# Patient Record
Sex: FEMALE | Race: WHITE | Employment: OTHER | URBAN - METROPOLITAN AREA
[De-identification: names, ages, dates, MRNs, and addresses within clinical notes are randomized per-mention and may not be internally consistent; named-entity substitution may affect disease eponyms.]

---

## 2020-03-23 VITALS
HEIGHT: 66 IN | DIASTOLIC BLOOD PRESSURE: 74 MMHG | BODY MASS INDEX: 33.61 KG/M2 | SYSTOLIC BLOOD PRESSURE: 148 MMHG | HEART RATE: 72 BPM | WEIGHT: 209.12 LBS

## 2020-03-23 RX ORDER — ZINC GLUCONATE 50 MG
50 TABLET ORAL DAILY
COMMUNITY

## 2020-03-23 RX ORDER — AMPICILLIN TRIHYDRATE 250 MG
500 CAPSULE ORAL DAILY
COMMUNITY

## 2020-03-23 RX ORDER — ALBUTEROL SULFATE 90 UG/1
2 AEROSOL, METERED RESPIRATORY (INHALATION) EVERY 6 HOURS PRN
COMMUNITY

## 2020-03-23 RX ORDER — AMOXICILLIN 500 MG
1200 CAPSULE ORAL DAILY
COMMUNITY

## 2020-03-23 RX ORDER — CHOLECALCIFEROL (VITAMIN D3) 125 MCG
500 CAPSULE ORAL DAILY
COMMUNITY

## 2020-03-23 RX ORDER — VITAMIN E 268 MG
400 CAPSULE ORAL DAILY
COMMUNITY

## 2024-01-12 ENCOUNTER — APPOINTMENT (OUTPATIENT)
Dept: GENERAL RADIOLOGY | Age: 89
DRG: 291 | End: 2024-01-12
Payer: MEDICARE

## 2024-01-12 ENCOUNTER — APPOINTMENT (OUTPATIENT)
Dept: ULTRASOUND IMAGING | Age: 89
DRG: 291 | End: 2024-01-12
Payer: MEDICARE

## 2024-01-12 ENCOUNTER — APPOINTMENT (OUTPATIENT)
Dept: CT IMAGING | Age: 89
DRG: 291 | End: 2024-01-12
Payer: MEDICARE

## 2024-01-12 ENCOUNTER — HOSPITAL ENCOUNTER (INPATIENT)
Age: 89
LOS: 6 days | DRG: 291 | End: 2024-01-18
Attending: STUDENT IN AN ORGANIZED HEALTH CARE EDUCATION/TRAINING PROGRAM | Admitting: FAMILY MEDICINE
Payer: MEDICARE

## 2024-01-12 DIAGNOSIS — E87.0 HYPERNATREMIA: ICD-10-CM

## 2024-01-12 DIAGNOSIS — J96.01 ACUTE HYPOXIC RESPIRATORY FAILURE (HCC): Primary | ICD-10-CM

## 2024-01-12 DIAGNOSIS — J18.9 PNEUMONIA DUE TO INFECTIOUS ORGANISM, UNSPECIFIED LATERALITY, UNSPECIFIED PART OF LUNG: ICD-10-CM

## 2024-01-12 DIAGNOSIS — J96.01 ACUTE RESPIRATORY FAILURE WITH HYPOXIA (HCC): ICD-10-CM

## 2024-01-12 DIAGNOSIS — N30.00 ACUTE CYSTITIS WITHOUT HEMATURIA: ICD-10-CM

## 2024-01-12 DIAGNOSIS — E04.1 THYROID NODULE GREATER THAN OR EQUAL TO 1.5 CM IN DIAMETER INCIDENTALLY NOTED ON IMAGING STUDY: ICD-10-CM

## 2024-01-12 DIAGNOSIS — I50.9 ACUTE ON CHRONIC CONGESTIVE HEART FAILURE, UNSPECIFIED HEART FAILURE TYPE (HCC): ICD-10-CM

## 2024-01-12 DIAGNOSIS — R41.82 ALTERED MENTAL STATUS, UNSPECIFIED ALTERED MENTAL STATUS TYPE: ICD-10-CM

## 2024-01-12 PROBLEM — I50.31 CHF (CONGESTIVE HEART FAILURE), NYHA CLASS I, ACUTE, DIASTOLIC (HCC): Status: ACTIVE | Noted: 2024-01-12

## 2024-01-12 LAB
ALBUMIN SERPL-MCNC: 4.2 G/DL (ref 3.5–5.2)
ALP SERPL-CCNC: 92 U/L (ref 35–104)
ALT SERPL-CCNC: 18 U/L (ref 0–32)
ANION GAP SERPL CALCULATED.3IONS-SCNC: 9 MMOL/L (ref 7–16)
AST SERPL-CCNC: 16 U/L (ref 0–31)
B.E.: 1 MMOL/L (ref -3–3)
BACTERIA URNS QL MICRO: ABNORMAL
BASOPHILS # BLD: 0.07 K/UL (ref 0–0.2)
BASOPHILS NFR BLD: 1 % (ref 0–2)
BILIRUB SERPL-MCNC: 0.3 MG/DL (ref 0–1.2)
BILIRUB UR QL STRIP: NEGATIVE
BNP SERPL-MCNC: 2602 PG/ML (ref 0–450)
BUN SERPL-MCNC: 29 MG/DL (ref 6–23)
CALCIUM SERPL-MCNC: 9.3 MG/DL (ref 8.6–10.2)
CHLORIDE SERPL-SCNC: 107 MMOL/L (ref 98–107)
CLARITY UR: ABNORMAL
CO2 SERPL-SCNC: 32 MMOL/L (ref 22–29)
COHB: 1.2 % (ref 0–1.5)
COLOR UR: YELLOW
CREAT SERPL-MCNC: 0.9 MG/DL (ref 0.5–1)
CRITICAL: ABNORMAL
DATE ANALYZED: ABNORMAL
DATE OF COLLECTION: ABNORMAL
EKG ATRIAL RATE: 68 BPM
EKG Q-T INTERVAL: 378 MS
EKG QRS DURATION: 90 MS
EKG QTC CALCULATION (BAZETT): 444 MS
EKG R AXIS: -62 DEGREES
EKG T AXIS: 76 DEGREES
EKG VENTRICULAR RATE: 83 BPM
EOSINOPHIL # BLD: 0.05 K/UL (ref 0.05–0.5)
EOSINOPHILS RELATIVE PERCENT: 1 % (ref 0–6)
EPI CELLS #/AREA URNS HPF: ABNORMAL /HPF
ERYTHROCYTE [DISTWIDTH] IN BLOOD BY AUTOMATED COUNT: 14.7 % (ref 11.5–15)
GFR SERPL CREATININE-BSD FRML MDRD: >60 ML/MIN/1.73M2
GLUCOSE SERPL-MCNC: 118 MG/DL (ref 74–99)
GLUCOSE UR STRIP-MCNC: NEGATIVE MG/DL
HCO3: 28.6 MMOL/L (ref 22–26)
HCT VFR BLD AUTO: 46.1 % (ref 34–48)
HGB BLD-MCNC: 14.1 G/DL (ref 11.5–15.5)
HGB UR QL STRIP.AUTO: NEGATIVE
HHB: 4 % (ref 0–5)
IMM GRANULOCYTES # BLD AUTO: 0.03 K/UL (ref 0–0.58)
IMM GRANULOCYTES NFR BLD: 0 % (ref 0–5)
INFLUENZA A BY PCR: NOT DETECTED
INFLUENZA B BY PCR: NOT DETECTED
KETONES UR STRIP-MCNC: NEGATIVE MG/DL
LAB: ABNORMAL
LACTATE BLDV-SCNC: 1.2 MMOL/L (ref 0.5–1.9)
LEUKOCYTE ESTERASE UR QL STRIP: NEGATIVE
LYMPHOCYTES NFR BLD: 0.81 K/UL (ref 1.5–4)
LYMPHOCYTES RELATIVE PERCENT: 8 % (ref 20–42)
Lab: 1940
MAGNESIUM SERPL-MCNC: 2.3 MG/DL (ref 1.6–2.6)
MCH RBC QN AUTO: 28.7 PG (ref 26–35)
MCHC RBC AUTO-ENTMCNC: 30.6 G/DL (ref 32–34.5)
MCV RBC AUTO: 93.7 FL (ref 80–99.9)
METHB: 0.2 % (ref 0–1.5)
MODE: ABNORMAL
MONOCYTES NFR BLD: 0.77 K/UL (ref 0.1–0.95)
MONOCYTES NFR BLD: 8 % (ref 2–12)
NEUTROPHILS NFR BLD: 82 % (ref 43–80)
NEUTS SEG NFR BLD: 7.87 K/UL (ref 1.8–7.3)
NITRITE UR QL STRIP: POSITIVE
O2 CONTENT: 19.2 ML/DL
O2 SATURATION: 95.9 % (ref 92–98.5)
O2HB: 94.6 % (ref 94–97)
OPERATOR ID: 5523
PATIENT TEMP: 37 C
PCO2: 58.1 MMHG (ref 35–45)
PH BLOOD GAS: 7.31 (ref 7.35–7.45)
PH UR STRIP: 5.5 [PH] (ref 5–9)
PH VENOUS: 7.29 (ref 7.35–7.45)
PLATELET # BLD AUTO: 171 K/UL (ref 130–450)
PMV BLD AUTO: 12.3 FL (ref 7–12)
PO2: 82.6 MMHG (ref 75–100)
POTASSIUM SERPL-SCNC: 4.6 MMOL/L (ref 3.5–5)
PROT SERPL-MCNC: 7.1 G/DL (ref 6.4–8.3)
PROT UR STRIP-MCNC: 30 MG/DL
RBC # BLD AUTO: 4.92 M/UL (ref 3.5–5.5)
RBC #/AREA URNS HPF: ABNORMAL /HPF
SARS-COV-2 RDRP RESP QL NAA+PROBE: NOT DETECTED
SODIUM SERPL-SCNC: 148 MMOL/L (ref 132–146)
SOURCE, BLOOD GAS: ABNORMAL
SP GR UR STRIP: >1.03 (ref 1–1.03)
SPECIMEN DESCRIPTION: NORMAL
THB: 14.4 G/DL (ref 11.5–16.5)
TIME ANALYZED: 1945
TROPONIN I SERPL HS-MCNC: 32 NG/L (ref 0–9)
UROBILINOGEN UR STRIP-ACNC: 1 EU/DL (ref 0–1)
WBC #/AREA URNS HPF: ABNORMAL /HPF
WBC OTHER # BLD: 9.6 K/UL (ref 4.5–11.5)

## 2024-01-12 PROCEDURE — 6360000002 HC RX W HCPCS: Performed by: STUDENT IN AN ORGANIZED HEALTH CARE EDUCATION/TRAINING PROGRAM

## 2024-01-12 PROCEDURE — 82805 BLOOD GASES W/O2 SATURATION: CPT

## 2024-01-12 PROCEDURE — 81001 URINALYSIS AUTO W/SCOPE: CPT

## 2024-01-12 PROCEDURE — 87040 BLOOD CULTURE FOR BACTERIA: CPT

## 2024-01-12 PROCEDURE — 6360000004 HC RX CONTRAST MEDICATION: Performed by: RADIOLOGY

## 2024-01-12 PROCEDURE — 2500000003 HC RX 250 WO HCPCS: Performed by: STUDENT IN AN ORGANIZED HEALTH CARE EDUCATION/TRAINING PROGRAM

## 2024-01-12 PROCEDURE — 84484 ASSAY OF TROPONIN QUANT: CPT

## 2024-01-12 PROCEDURE — 99285 EMERGENCY DEPT VISIT HI MDM: CPT

## 2024-01-12 PROCEDURE — 2060000000 HC ICU INTERMEDIATE R&B

## 2024-01-12 PROCEDURE — 71260 CT THORAX DX C+: CPT

## 2024-01-12 PROCEDURE — 76536 US EXAM OF HEAD AND NECK: CPT

## 2024-01-12 PROCEDURE — 96365 THER/PROPH/DIAG IV INF INIT: CPT

## 2024-01-12 PROCEDURE — 96375 TX/PRO/DX INJ NEW DRUG ADDON: CPT

## 2024-01-12 PROCEDURE — 87077 CULTURE AEROBIC IDENTIFY: CPT

## 2024-01-12 PROCEDURE — 82800 BLOOD PH: CPT

## 2024-01-12 PROCEDURE — 94640 AIRWAY INHALATION TREATMENT: CPT

## 2024-01-12 PROCEDURE — 93010 ELECTROCARDIOGRAM REPORT: CPT | Performed by: INTERNAL MEDICINE

## 2024-01-12 PROCEDURE — 6360000002 HC RX W HCPCS: Performed by: FAMILY MEDICINE

## 2024-01-12 PROCEDURE — 70450 CT HEAD/BRAIN W/O DYE: CPT

## 2024-01-12 PROCEDURE — 80053 COMPREHEN METABOLIC PANEL: CPT

## 2024-01-12 PROCEDURE — 85025 COMPLETE CBC W/AUTO DIFF WBC: CPT

## 2024-01-12 PROCEDURE — 2580000003 HC RX 258: Performed by: STUDENT IN AN ORGANIZED HEALTH CARE EDUCATION/TRAINING PROGRAM

## 2024-01-12 PROCEDURE — 93005 ELECTROCARDIOGRAM TRACING: CPT | Performed by: STUDENT IN AN ORGANIZED HEALTH CARE EDUCATION/TRAINING PROGRAM

## 2024-01-12 PROCEDURE — 83735 ASSAY OF MAGNESIUM: CPT

## 2024-01-12 PROCEDURE — 87635 SARS-COV-2 COVID-19 AMP PRB: CPT

## 2024-01-12 PROCEDURE — 87502 INFLUENZA DNA AMP PROBE: CPT

## 2024-01-12 PROCEDURE — 83605 ASSAY OF LACTIC ACID: CPT

## 2024-01-12 PROCEDURE — 71045 X-RAY EXAM CHEST 1 VIEW: CPT

## 2024-01-12 PROCEDURE — 83880 ASSAY OF NATRIURETIC PEPTIDE: CPT

## 2024-01-12 PROCEDURE — 6370000000 HC RX 637 (ALT 250 FOR IP): Performed by: STUDENT IN AN ORGANIZED HEALTH CARE EDUCATION/TRAINING PROGRAM

## 2024-01-12 RX ORDER — ONDANSETRON 2 MG/ML
4 INJECTION INTRAMUSCULAR; INTRAVENOUS EVERY 6 HOURS PRN
Status: DISCONTINUED | OUTPATIENT
Start: 2024-01-12 | End: 2024-01-18 | Stop reason: HOSPADM

## 2024-01-12 RX ORDER — 0.9 % SODIUM CHLORIDE 0.9 %
500 INTRAVENOUS SOLUTION INTRAVENOUS ONCE
Status: COMPLETED | OUTPATIENT
Start: 2024-01-12 | End: 2024-01-12

## 2024-01-12 RX ORDER — ENOXAPARIN SODIUM 100 MG/ML
40 INJECTION SUBCUTANEOUS DAILY
Status: DISCONTINUED | OUTPATIENT
Start: 2024-01-12 | End: 2024-01-18 | Stop reason: DRUGHIGH

## 2024-01-12 RX ORDER — ACETAMINOPHEN 325 MG/1
650 TABLET ORAL EVERY 6 HOURS PRN
Status: DISCONTINUED | OUTPATIENT
Start: 2024-01-12 | End: 2024-01-18 | Stop reason: HOSPADM

## 2024-01-12 RX ORDER — ACETAMINOPHEN 650 MG/1
650 SUPPOSITORY RECTAL EVERY 6 HOURS PRN
Status: DISCONTINUED | OUTPATIENT
Start: 2024-01-12 | End: 2024-01-18 | Stop reason: HOSPADM

## 2024-01-12 RX ORDER — POTASSIUM CHLORIDE 7.45 MG/ML
10 INJECTION INTRAVENOUS PRN
Status: DISCONTINUED | OUTPATIENT
Start: 2024-01-12 | End: 2024-01-18 | Stop reason: HOSPADM

## 2024-01-12 RX ORDER — SODIUM CHLORIDE 0.9 % (FLUSH) 0.9 %
5-40 SYRINGE (ML) INJECTION PRN
Status: DISCONTINUED | OUTPATIENT
Start: 2024-01-12 | End: 2024-01-18 | Stop reason: HOSPADM

## 2024-01-12 RX ORDER — POTASSIUM CHLORIDE 20 MEQ/1
40 TABLET, EXTENDED RELEASE ORAL PRN
Status: DISCONTINUED | OUTPATIENT
Start: 2024-01-12 | End: 2024-01-18 | Stop reason: HOSPADM

## 2024-01-12 RX ORDER — POLYETHYLENE GLYCOL 3350 17 G/17G
17 POWDER, FOR SOLUTION ORAL DAILY PRN
Status: DISCONTINUED | OUTPATIENT
Start: 2024-01-12 | End: 2024-01-18 | Stop reason: HOSPADM

## 2024-01-12 RX ORDER — LISINOPRIL 5 MG/1
5 TABLET ORAL DAILY
Status: DISCONTINUED | OUTPATIENT
Start: 2024-01-13 | End: 2024-01-18 | Stop reason: HOSPADM

## 2024-01-12 RX ORDER — IPRATROPIUM BROMIDE AND ALBUTEROL SULFATE 2.5; .5 MG/3ML; MG/3ML
1 SOLUTION RESPIRATORY (INHALATION) ONCE
Status: COMPLETED | OUTPATIENT
Start: 2024-01-12 | End: 2024-01-12

## 2024-01-12 RX ORDER — SODIUM CHLORIDE 9 MG/ML
INJECTION, SOLUTION INTRAVENOUS PRN
Status: DISCONTINUED | OUTPATIENT
Start: 2024-01-12 | End: 2024-01-18 | Stop reason: HOSPADM

## 2024-01-12 RX ORDER — BUMETANIDE 0.25 MG/ML
2 INJECTION INTRAMUSCULAR; INTRAVENOUS DAILY
Status: DISCONTINUED | OUTPATIENT
Start: 2024-01-13 | End: 2024-01-18 | Stop reason: HOSPADM

## 2024-01-12 RX ORDER — CARVEDILOL 3.12 MG/1
3.12 TABLET ORAL 2 TIMES DAILY WITH MEALS
Status: DISCONTINUED | OUTPATIENT
Start: 2024-01-13 | End: 2024-01-18 | Stop reason: HOSPADM

## 2024-01-12 RX ORDER — MAGNESIUM SULFATE IN WATER 40 MG/ML
2000 INJECTION, SOLUTION INTRAVENOUS PRN
Status: DISCONTINUED | OUTPATIENT
Start: 2024-01-12 | End: 2024-01-18 | Stop reason: HOSPADM

## 2024-01-12 RX ORDER — SODIUM CHLORIDE 0.9 % (FLUSH) 0.9 %
5-40 SYRINGE (ML) INJECTION EVERY 12 HOURS SCHEDULED
Status: DISCONTINUED | OUTPATIENT
Start: 2024-01-12 | End: 2024-01-18 | Stop reason: HOSPADM

## 2024-01-12 RX ORDER — FUROSEMIDE 10 MG/ML
20 INJECTION INTRAMUSCULAR; INTRAVENOUS ONCE
Status: COMPLETED | OUTPATIENT
Start: 2024-01-12 | End: 2024-01-12

## 2024-01-12 RX ORDER — ONDANSETRON 4 MG/1
4 TABLET, ORALLY DISINTEGRATING ORAL EVERY 8 HOURS PRN
Status: DISCONTINUED | OUTPATIENT
Start: 2024-01-12 | End: 2024-01-18 | Stop reason: HOSPADM

## 2024-01-12 RX ADMIN — SODIUM CHLORIDE 500 ML: 9 INJECTION, SOLUTION INTRAVENOUS at 14:41

## 2024-01-12 RX ADMIN — IPRATROPIUM BROMIDE AND ALBUTEROL SULFATE 1 DOSE: .5; 3 SOLUTION RESPIRATORY (INHALATION) at 14:08

## 2024-01-12 RX ADMIN — ENOXAPARIN SODIUM 40 MG: 100 INJECTION SUBCUTANEOUS at 22:56

## 2024-01-12 RX ADMIN — WATER 2000 MG: 1 INJECTION INTRAMUSCULAR; INTRAVENOUS; SUBCUTANEOUS at 17:56

## 2024-01-12 RX ADMIN — IOPAMIDOL 75 ML: 755 INJECTION, SOLUTION INTRAVENOUS at 18:30

## 2024-01-12 RX ADMIN — FUROSEMIDE 20 MG: 10 INJECTION, SOLUTION INTRAMUSCULAR; INTRAVENOUS at 19:46

## 2024-01-12 RX ADMIN — DOXYCYCLINE 100 MG: 100 INJECTION, POWDER, LYOPHILIZED, FOR SOLUTION INTRAVENOUS at 17:56

## 2024-01-12 NOTE — ED PROVIDER NOTES
weight based adjustment of the mA/kV was utilized to reduce the radiation dose to as low as reasonably achievable. COMPARISON: None. HISTORY: ORDERING SYSTEM PROVIDED HISTORY: concern for mass in head TECHNOLOGIST PROVIDED HISTORY: Reason for exam:->concern for mass in head Additional Contrast?->None FINDINGS: Mediastinum: There is a large, 6.5 x 5.8 x 8.7 cm right thyroid lobe nodule, which extends into the upper mediastinum an resulting displacement of the trachea towards the left by approximately 1.3 cm. The heart is mildly enlarged.  Moderate pericardial effusion.  Moderate calcified atherosclerosis is seen in the aorta.  No aneurysm.  The main pulmonary artery is normal in caliber.  No enlarged lymph node is seen. Lungs/pleura: Mild interstitial edema is seen.  Compressive atelectasis seen in the lower lobes.  Small to moderate bilateral pleural effusions. Upper Abdomen: No acute abnormality seen in the upper abdomen. Soft Tissues/Bones: The visualized bones are intact without fracture or focal lesion.     1. Cardiomegaly with mild interstitial edema and small to moderate bilateral pleural effusions.  Findings are consistent with CHF exacerbation. 2. Moderate pericardial effusion. 3. Large, 6.5 x 5.8 x 8.7 cm right thyroid lobe nodule, which extends into the upper mediastinum an resulting in displacement of the trachea towards the left by approximately 1.3 cm.  Further evaluation with sonography and FNA recommended.     CT HEAD WO CONTRAST    Result Date: 1/12/2024  EXAMINATION: CT OF THE HEAD WITHOUT CONTRAST  1/12/2024 3:06 pm TECHNIQUE: CT of the head was performed without the administration of intravenous contrast. Automated exposure control, iterative reconstruction, and/or weight based adjustment of the mA/kV was utilized to reduce the radiation dose to as low as reasonably achievable. COMPARISON: None. HISTORY: ORDERING SYSTEM PROVIDED HISTORY: [facility] TECHNOLOGIST PROVIDED HISTORY: Reason for exam:->ams Has  upper mediastinum an resulting in displacement of the trachea towards the   left by approximately 1.3 cm.  Further evaluation with sonography and FNA   recommended.         CT HEAD WO CONTRAST   Final Result   No acute intracranial abnormality.      Mild, age-appropriate cerebral atrophy.      Mild right maxillary acute sinusitis.         XR CHEST PORTABLE   Final Result   1. Bilateral lower lobe airspace disease, left greater than right,   atelectasis versus pneumonia.   2. Mild congestive failure.   3. Soft tissue fullness in the right superior mediastinum with moderate   displacement of the trachea towards the left. I suspect that this is a large   right thyroid goiter with prominent substernal extension, but other superior   mediastinal mass cannot be excluded.      RECOMMENDATION:   Recommend CT of the chest with contrast on a nonemergent basis to further   characterize the right superior mediastinal mass.                 ------------------------- NURSING NOTES AND VITALS REVIEWED ---------------------------  Date / Time Roomed:  1/12/2024  1:24 PM  ED Bed Assignment:  17/17    The nursing notes within the ED encounter and vital signs as below have been reviewed.     Patient Vitals for the past 24 hrs:   BP Temp Temp src Pulse Resp SpO2   01/12/24 1946 (!) 161/84 -- -- -- -- --   01/12/24 1800 -- -- -- 81 29 97 %   01/12/24 1730 (!) 144/105 -- -- (!) 209 24 (!) 78 %   01/12/24 1700 (!) 151/90 -- -- 76 25 96 %   01/12/24 1630 (!) 164/112 -- -- 73 23 93 %   01/12/24 1238 (!) 119/102 98.9 °F (37.2 °C) Oral 88 22 99 %       Oxygen Saturation Interpretation: Normal    ------------------------------------------ PROGRESS NOTES ------------------------------------------  Re-evaluation(s):   Patient’s symptoms show no change  Repeat physical examination is not changed    Counseling:  I have spoken with the patient and discussed today’s results, in addition to providing specific details for the plan of care and

## 2024-01-13 LAB
ANION GAP SERPL CALCULATED.3IONS-SCNC: 13 MMOL/L (ref 7–16)
BUN SERPL-MCNC: 27 MG/DL (ref 6–23)
CALCIUM SERPL-MCNC: 8.7 MG/DL (ref 8.6–10.2)
CHLORIDE SERPL-SCNC: 106 MMOL/L (ref 98–107)
CHOLEST SERPL-MCNC: 145 MG/DL
CO2 SERPL-SCNC: 29 MMOL/L (ref 22–29)
CREAT SERPL-MCNC: 0.8 MG/DL (ref 0.5–1)
GFR SERPL CREATININE-BSD FRML MDRD: >60 ML/MIN/1.73M2
GLUCOSE SERPL-MCNC: 126 MG/DL (ref 74–99)
HDLC SERPL-MCNC: 49 MG/DL
LDLC SERPL CALC-MCNC: 78 MG/DL
MAGNESIUM SERPL-MCNC: 2.2 MG/DL (ref 1.6–2.6)
POTASSIUM SERPL-SCNC: 5.2 MMOL/L (ref 3.5–5)
SODIUM SERPL-SCNC: 148 MMOL/L (ref 132–146)
TRIGL SERPL-MCNC: 88 MG/DL
TROPONIN I SERPL HS-MCNC: 30 NG/L (ref 0–9)
VLDLC SERPL CALC-MCNC: 18 MG/DL

## 2024-01-13 PROCEDURE — 36415 COLL VENOUS BLD VENIPUNCTURE: CPT

## 2024-01-13 PROCEDURE — 80061 LIPID PANEL: CPT

## 2024-01-13 PROCEDURE — 87040 BLOOD CULTURE FOR BACTERIA: CPT

## 2024-01-13 PROCEDURE — 87081 CULTURE SCREEN ONLY: CPT

## 2024-01-13 PROCEDURE — 87899 AGENT NOS ASSAY W/OPTIC: CPT

## 2024-01-13 PROCEDURE — 80048 BASIC METABOLIC PNL TOTAL CA: CPT

## 2024-01-13 PROCEDURE — 83735 ASSAY OF MAGNESIUM: CPT

## 2024-01-13 PROCEDURE — 84484 ASSAY OF TROPONIN QUANT: CPT

## 2024-01-13 PROCEDURE — 2580000003 HC RX 258: Performed by: FAMILY MEDICINE

## 2024-01-13 PROCEDURE — 87449 NOS EACH ORGANISM AG IA: CPT

## 2024-01-13 PROCEDURE — 99223 1ST HOSP IP/OBS HIGH 75: CPT | Performed by: INTERNAL MEDICINE

## 2024-01-13 PROCEDURE — 6360000002 HC RX W HCPCS: Performed by: FAMILY MEDICINE

## 2024-01-13 PROCEDURE — 2060000000 HC ICU INTERMEDIATE R&B

## 2024-01-13 PROCEDURE — 84145 PROCALCITONIN (PCT): CPT

## 2024-01-13 RX ADMIN — BUMETANIDE 2 MG: 0.25 INJECTION INTRAMUSCULAR; INTRAVENOUS at 11:24

## 2024-01-13 RX ADMIN — ENOXAPARIN SODIUM 40 MG: 100 INJECTION SUBCUTANEOUS at 11:24

## 2024-01-13 RX ADMIN — SODIUM CHLORIDE, PRESERVATIVE FREE 10 ML: 5 INJECTION INTRAVENOUS at 11:25

## 2024-01-13 NOTE — H&P
HISTORY AND PHYSICAL             Date: 1/13/2024        Patient Name: Cris Mejia     YOB: 1935      Age:  88 y.o.    Chief Complaint     Chief Complaint   Patient presents with    Shortness of Breath     81% on room air, 96% on 4L NC; afib for ems with no hx     Altered Mental Status     From [facility] via ems, hx alzheimer, no meds, alert to self baseline, increased confusion and lethargy; no last known well per nursing home; DNR-CCA, bgl 164 for ems           History Obtained From   electronic medical record    History of Present Illness   88-year-old female from assisted living presents with increased lethargy and shortness of breath over the past few days.  Patient has a history of hypertension hyperlipidemia, diabetes, Alzheimer's dementia history of breast cancer.  Patient had an episode of dysphagia a week ago.  Speech pathology had recommended a modified barium swallow.  Patient presented to the ED for workup.  CT showed cardiomegaly with pulmonary edema.  proBNP was elevated at 2000.  pH on ABG was 7.3.  Patient improved with oxygen IV diuresis.  Right thyroid nodule noted for recommending fine-needle aspiration as well.  Patient will be admitted for acute hypoxic respiratory failure, CHF exacerbation.    Past Medical History     Past Medical History:   Diagnosis Date    Asthma     CAD (coronary artery disease)     Hypercalcemia     Hypertension     Hypokalemia     Obesity     Osteoarthritis of knee         Past Surgical History   No past surgical history on file.     Medications Prior to Admission     Prior to Admission medications    Medication Sig Start Date End Date Taking? Authorizing Provider   Calcium-Magnesium-Vitamin D 600-300-400 LIQD Take by mouth daily Indications: 3 by mouth daily    Roberth Arellano MD   Cinnamon 500 MG CAPS Take 500 mg by mouth daily    Roberth Arellano MD   vitamin E 400 UNIT capsule Take 400 Units by mouth daily    Roberth Arellano  emergency medical condition->Emergency Medical Condition (MA) FINDINGS: BRAIN/VENTRICLES: There is no acute intracranial hemorrhage, mass effect or midline shift.  No abnormal extra-axial fluid collection.  The gray-white differentiation is maintained without evidence of an acute infarct.  There is no evidence of hydrocephalus. There is mild age-appropriate dilatation of the ventricles and sulci. ORBITS: There are changes of bilateral cataract surgery.  The orbits are otherwise normal in appearance. SINUSES: There is a mild right maxillary sinus air-fluid level from acute sinusitis.  The rest of the visualized paranasal sinuses and mastoids are clear. SOFT TISSUES/SKULL:  No acute abnormality of the visualized skull or soft tissues. There is moderate hyperostosis frontalis.     No acute intracranial abnormality. Mild, age-appropriate cerebral atrophy. Mild right maxillary acute sinusitis.     XR CHEST PORTABLE    Result Date: 1/12/2024  EXAMINATION: ONE XRAY VIEW OF THE CHEST 1/12/2024 2:39 pm COMPARISON: None. HISTORY: ORDERING SYSTEM PROVIDED HISTORY: shortness of breath TECHNOLOGIST PROVIDED HISTORY: Reason for exam:->shortness of breath FINDINGS: There is prominent consolidation of the retrocardiac left lower lobe along with a small left pleural effusion, atelectasis versus pneumonia. There is moderate patchy infiltrate throughout the right lower lobe with a tiny right pleural effusion, atelectasis versus pneumonia. There is mild vascular engorgement and mild interstitial pulmonary edema throughout the rest of the chest, findings of mild congestive failure. The heart is top-normal in size. There is prominent soft tissue fullness in the right superior mediastinum with moderate displacement of the trachea towards the left.  I suspect that this is a large right thyroid goiter with prominent substernal extension, but other superior mediastinal mass cannot be excluded.  Recommend correlation with CT of the chest with

## 2024-01-13 NOTE — CONSULTS
Comprehensive Nutrition Assessment    Type and Reason for Visit:  Initial, Consult, Patient Education    Nutrition Recommendations/Plan:   Continue current diet  Monitor nutrition progression and provide recommendations as indicated/medically appropriate      Malnutrition Assessment:  Malnutrition Status:  At risk for malnutrition (Comment) (d/t dysphagia) (01/13/24 1228)    Context:        Findings of the 6 clinical characteristics of malnutrition:  Energy Intake:  Unable to assess (d/t ams)  Weight Loss:  Unable to assess (no wt hx in emr)     Body Fat Loss:  No significant body fat loss     Muscle Mass Loss:  No significant muscle mass loss    Fluid Accumulation:  No significant fluid accumulation     Strength:       Nutrition Assessment:    Pt admit for ARF w/ hypoxia, AMS. Dx: CHF exacerbation, pulmonary edema, cardimegaly, right tyhroid nodule that may be causing dysphagia. Speech following, swallow eval pending. PMHx: Alzheimer's, HTN, HLD, Breast cancer, Diabetes. Pt consult for DI. Pt inappropriate at this time d/t AMS. Upon chart reveiw pt is at potential risk for MAL d/t dyphagia `1 week ago, AMS and thyroid nodule. FILIBERTO po intake d/t AMS. Malnutrition not identified. Continue curretn diet, continue to monitor nutrition progression and provide recommendations as indicated/medically appropriate, f/up per policy.    Nutrition Related Findings:    pt a//o x0, unable to be aroused, I/O =100, Na+ 148, k+ 5.2, BUN 27, Troponin 30, HCo#28.6, PH blood gas 7.310, PCO2 58.1 Wound Type: None       Current Nutrition Intake & Therapies:    Average Meal Intake: Unable to assess  Average Supplements Intake: None Ordered  ADULT DIET; Regular; No Added Salt (3-4 gm)    Anthropometric Measures:  Height:  (no height in emr)  Ideal Body Weight (IBW):   ( )    Admission Body Weight: 77.3 kg (170 lb 6.7 oz)  Current Body Weight: 77.3 kg (170 lb 6.7 oz),   IBW. Weight Source: Not Specified (01/13/24)  Current BMI (kg/m2):   Scheduled

## 2024-01-13 NOTE — PROGRESS NOTES
SPEECH LANGUAGE PATHOLOGY  DAILY PROGRESS NOTE        PATIENT NAME:  Cris Mejia      :  1935          TODAY'S DATE:  2024 ROOM:  [address] [place] [address]    Attempted to see patient for bedside swallow evaluation this date.  Nurse was present in the room.  We attempted to wake patient but patient did not respond and did not follow any directives to open her eyes.  The nurse reports that she has not been able to wake the patient since her arrival to this unit earlier this morning.    Since patient is not able to be awake and alert, it is not appropriate to attempt any food/liquid presentation at this time.  Will complete the requested video swallow study on Monday if patient is alert and able to actively participate.      Eugenie Marcos MA/[facility]-SL[facility]  [phone]

## 2024-01-13 NOTE — CONSULTS
[address] [place] [address] [address] [place] [address]  Phone [phone]   [phone]      Admit Date: 2024  1:24 PM  Pt Name: Cris Mejia  MRN: [id number]  : 1935  Reason for Consult:    Chief Complaint   Patient presents with    Shortness of Breath     81% on room air, 96% on 4L NC; afib for ems with no hx     Altered Mental Status     From [facility] via ems, hx alzheimer, no meds, alert to self baseline, increased confusion and lethargy; no last known well per nursing home; DNR-CCA, bgl 164 for ems      Requesting Physician:  Matt Rodríguez DO  PCP: Matt Rodríguez DO  History Obtained From:  patient, chart   ID consulted for Hypernatremia [E87.0]  Acute cystitis without hematuria [N30.00]  Acute respiratory failure with hypoxia (HCC) [J96.01]  CHF (congestive heart failure), NYHA class I, acute, diastolic (HCC) [I50.31]  Altered mental status, unspecified altered mental status type [R41.82]  Pneumonia due to infectious organism, unspecified laterality, unspecified part of lung [J18.9]  Acute on chronic congestive heart failure, unspecified heart failure type (HCC) [I50.9]  Acute hypoxic respiratory failure (HCC) [J96.01]  on hospital day 1  CHIEF COMPLAINT       Chief Complaint   Patient presents with    Shortness of Breath     81% on room air, 96% on 4L NC; afib for ems with no hx     Altered Mental Status     From [facility] via ems, hx alzheimer, no meds, alert to self baseline, increased confusion and lethargy; no last known well per nursing home; DNR-CCA, bgl 164 for ems      HISTORYOF PRESENT ILLNESS   Cris Mejia is a 88 y.o. female who  has a past medical history of Asthma, CAD (coronary artery disease), Hypercalcemia, Hypertension, Hypokalemia, Obesity, and Osteoarthritis of knee.   Presented to ED TRIAGE VITALS  BP: 134/83, Temp: 97.4 °F (36.3 °C), Pulse: 99, Respirations: 18, SpO2: 99 %  HPI:  ID was consulted on 24 for infection management  Pt  confusion and lethargy; no last known well per nursing home; DNR-CCA, bgl 164 for ems     and admitted for Hypernatremia [E87.0]  Acute cystitis without hematuria [N30.00]  Acute respiratory failure with hypoxia (HCC) [J96.01]  CHF (congestive heart failure), NYHA class I, acute, diastolic (HCC) [I50.31]  Altered mental status, unspecified altered mental status type [R41.82]  Pneumonia due to infectious organism, unspecified laterality, unspecified part of lung [J18.9]  Acute on chronic congestive heart failure, unspecified heart failure type (HCC) [I50.9]  Acute hypoxic respiratory failure (HCC) [J96.01]  Pt with altered MS  Gpc clusters bacteremia  vanco x1 level in am   Uti cx pending   Bilateral lower lobe airspace disease, left greater than right,   atelectasis versus pneumonia.  Covid/flu neg cover aspiration   Check procal Unasyn/azithromycin    Thyroid nodule for FNA   No family present        ampicillin-sulbactam (UNASYN) 3,000 mg in sodium chloride 0.9 % 100 mL IVPB ([id number]), Q6H  azithromycin (ZITHROMAX) 500 mg in sodium chloride 0.9 % 250 mL IVPB ([id number]), Q24H           Available labs, imaging studies, microbiologic studies have been reviewed.       Thank you for involving me in the care of Cris Mejia. Please do not hesitate to call [phone]  for any questions or concerns.         Electronically signed by Cherie Cuevas MD on 1/13/2024 at 8:04 PM

## 2024-01-13 NOTE — PROGRESS NOTES
Patient chart reviewed and discussed with attending physician. CT reviewed with large 8.7 cm right thyroid nodule extending into the mediastinum. Will defer inpatient consult for large thyroid nodule. Outpatient FNA ordered and will have patient follow up with the results. Dr. Rodríguez updated.     Santy Miller DO,  Resident Physician, PGY-2  Department of Otolaryngology, [facility]

## 2024-01-14 LAB
ANION GAP SERPL CALCULATED.3IONS-SCNC: 12 MMOL/L (ref 7–16)
BUN SERPL-MCNC: 41 MG/DL (ref 6–23)
CALCIUM SERPL-MCNC: 8.9 MG/DL (ref 8.6–10.2)
CHLORIDE SERPL-SCNC: 109 MMOL/L (ref 98–107)
CO2 SERPL-SCNC: 30 MMOL/L (ref 22–29)
CREAT SERPL-MCNC: 1.1 MG/DL (ref 0.5–1)
DATE LAST DOSE: NORMAL
GFR SERPL CREATININE-BSD FRML MDRD: 49 ML/MIN/1.73M2
GLUCOSE SERPL-MCNC: 74 MG/DL (ref 74–99)
MAGNESIUM SERPL-MCNC: 2.2 MG/DL (ref 1.6–2.6)
POTASSIUM SERPL-SCNC: 4.5 MMOL/L (ref 3.5–5)
PROCALCITONIN SERPL-MCNC: 0.05 NG/ML (ref 0–0.08)
SODIUM SERPL-SCNC: 151 MMOL/L (ref 132–146)
TME LAST DOSE: NORMAL H
VANCOMYCIN DOSE: NORMAL MG
VANCOMYCIN SERPL-MCNC: 14.4 UG/ML (ref 5–40)

## 2024-01-14 PROCEDURE — 36415 COLL VENOUS BLD VENIPUNCTURE: CPT

## 2024-01-14 PROCEDURE — 99233 SBSQ HOSP IP/OBS HIGH 50: CPT | Performed by: INTERNAL MEDICINE

## 2024-01-14 PROCEDURE — 2580000003 HC RX 258: Performed by: SPECIALIST

## 2024-01-14 PROCEDURE — 80202 ASSAY OF VANCOMYCIN: CPT

## 2024-01-14 PROCEDURE — 6360000002 HC RX W HCPCS: Performed by: FAMILY MEDICINE

## 2024-01-14 PROCEDURE — 83735 ASSAY OF MAGNESIUM: CPT

## 2024-01-14 PROCEDURE — 6370000000 HC RX 637 (ALT 250 FOR IP): Performed by: FAMILY MEDICINE

## 2024-01-14 PROCEDURE — 80048 BASIC METABOLIC PNL TOTAL CA: CPT

## 2024-01-14 PROCEDURE — 2060000000 HC ICU INTERMEDIATE R&B

## 2024-01-14 PROCEDURE — 2580000003 HC RX 258: Performed by: FAMILY MEDICINE

## 2024-01-14 PROCEDURE — 6360000002 HC RX W HCPCS: Performed by: SPECIALIST

## 2024-01-14 RX ORDER — DEXTROSE AND SODIUM CHLORIDE 5; .45 G/100ML; G/100ML
INJECTION, SOLUTION INTRAVENOUS CONTINUOUS
Status: DISCONTINUED | OUTPATIENT
Start: 2024-01-14 | End: 2024-01-18 | Stop reason: HOSPADM

## 2024-01-14 RX ADMIN — ACETAMINOPHEN 650 MG: 650 SUPPOSITORY RECTAL at 10:07

## 2024-01-14 RX ADMIN — SODIUM CHLORIDE 3000 MG: 9 INJECTION, SOLUTION INTRAVENOUS at 23:58

## 2024-01-14 RX ADMIN — SODIUM CHLORIDE, PRESERVATIVE FREE 10 ML: 5 INJECTION INTRAVENOUS at 10:09

## 2024-01-14 RX ADMIN — SODIUM CHLORIDE 3000 MG: 9 INJECTION, SOLUTION INTRAVENOUS at 00:30

## 2024-01-14 RX ADMIN — AZITHROMYCIN MONOHYDRATE 500 MG: 500 INJECTION, POWDER, LYOPHILIZED, FOR SOLUTION INTRAVENOUS at 21:20

## 2024-01-14 RX ADMIN — SODIUM CHLORIDE 3000 MG: 9 INJECTION, SOLUTION INTRAVENOUS at 18:20

## 2024-01-14 RX ADMIN — SODIUM CHLORIDE 3000 MG: 9 INJECTION, SOLUTION INTRAVENOUS at 12:42

## 2024-01-14 RX ADMIN — BUMETANIDE 2 MG: 0.25 INJECTION INTRAMUSCULAR; INTRAVENOUS at 10:07

## 2024-01-14 RX ADMIN — ENOXAPARIN SODIUM 40 MG: 100 INJECTION SUBCUTANEOUS at 10:07

## 2024-01-14 RX ADMIN — VANCOMYCIN HYDROCHLORIDE 1250 MG: 10 INJECTION, POWDER, LYOPHILIZED, FOR SOLUTION INTRAVENOUS at 02:13

## 2024-01-14 RX ADMIN — DEXTROSE AND SODIUM CHLORIDE: 5; 450 INJECTION, SOLUTION INTRAVENOUS at 21:22

## 2024-01-14 RX ADMIN — SODIUM CHLORIDE 3000 MG: 9 INJECTION, SOLUTION INTRAVENOUS at 05:52

## 2024-01-14 RX ADMIN — AZITHROMYCIN MONOHYDRATE 500 MG: 500 INJECTION, POWDER, LYOPHILIZED, FOR SOLUTION INTRAVENOUS at 00:25

## 2024-01-14 RX ADMIN — ACETAMINOPHEN 650 MG: 650 SUPPOSITORY RECTAL at 02:40

## 2024-01-14 RX ADMIN — SODIUM CHLORIDE, PRESERVATIVE FREE 10 ML: 5 INJECTION INTRAVENOUS at 21:21

## 2024-01-14 RX ADMIN — DEXTROSE AND SODIUM CHLORIDE: 5; 450 INJECTION, SOLUTION INTRAVENOUS at 11:36

## 2024-01-14 NOTE — PLAN OF CARE
Problem: Nutrition Deficit:  Goal: Optimize nutritional status  1/14/2024 0058 by Cleopatra Braden, RN  Outcome: Progressing  1/13/2024 1821 by Renata Latif RN  Outcome: Not Progressing  1/13/2024 1232 by Rahel Tinoco RD  Flowsheets (Taken 1/13/2024 1232)  Nutrient intake appropriate for improving, restoring, or maintaining nutritional needs:   Recommend appropriate diets, oral nutritional supplements, and vitamin/mineral supplements   Monitor oral intake, labs, and treatment plans   Assess nutritional status and recommend course of action

## 2024-01-14 NOTE — PROGRESS NOTES
Progress Note  Date:2024       Room:[facility]/0623-02  Patient Name:Cris Mejia     YOB: 1935     Age:88 y.o.        Subjective    Subjective:  Symptoms:  Stable.  She reports malaise and weakness.    Diet:  NPO.  No nausea or vomiting.    Activity level: Impaired due to weakness.       Review of Systems   Gastrointestinal:  Negative for nausea and vomiting.   Neurological:  Positive for weakness.   All other systems reviewed and are negative.    Objective         Vitals Last 24 Hours:  TEMPERATURE:  Temp  Av.9 °F (36.6 °C)  Min: 97.1 °F (36.2 °C)  Max: 99.1 °F (37.3 °C)  RESPIRATIONS RANGE: Resp  Av.5  Min: 18  Max: 20  PULSE OXIMETRY RANGE: SpO2  Av.4 %  Min: 93 %  Max: 99 %  PULSE RANGE: Pulse  Av.8  Min: 78  Max: 99  BLOOD PRESSURE RANGE: Systolic (24hrs), Av , Min:104 , Max:138   ; Diastolic (24hrs), Av, Min:43, Max:83    I/O (24Hr):    Intake/Output Summary (Last 24 hours) at 2024 1054  Last data filed at 2024 1748  Gross per 24 hour   Intake --   Output 300 ml   Net -300 ml     Objective:  General Appearance:  Ill-appearing.    Vital signs: (most recent): Blood pressure (!) 127/58, pulse 96, temperature 98.1 °F (36.7 °C), temperature source Axillary, resp. rate 20, height 1.651 m (5' 5\"), weight 86.6 kg (191 lb), SpO2 94 %.  Vital signs are normal.    Output: Producing urine and producing stool.    HEENT: Normal HEENT exam.    Lungs:  Normal effort and normal respiratory rate.  There are decreased breath sounds.    Heart: Normal rate.  Regular rhythm.    Abdomen: Abdomen is soft.  Bowel sounds are normal.   There is no abdominal tenderness.     Extremities: Decreased range of motion.    Neurological: Patient is alert.      Labs/Imaging/Diagnostics    Labs:  CBC:  Recent Labs     24  1430   WBC 9.6   RBC 4.92   HGB 14.1   HCT 46.1   MCV 93.7   RDW 14.7        CHEMISTRIES:  Recent Labs     24  1430 24  0542 24  0836   NA

## 2024-01-14 NOTE — PROGRESS NOTES
NAME: Cris Mejia  MR:  [id number]  :   1935  Admit Date:  2024    Elements of this note, were copied and pasted from Previous. Updates have been made where noted and reflect current exam and medical decision making from the DOS of this encounter.  CHIEF COMPLAINT       Chief Complaint   Patient presents with    Shortness of Breath     81% on room air, 96% on 4L NC; afib for ems with no hx     Altered Mental Status     From [facility] via [facility], hx alzheimer, no meds, alert to self baseline, increased confusion and lethargy; no last known well per nursing home; DNR-CCA, bgl 164 for ems      HISTORY OF PRESENT ILLNESS     Cris Mejia is a 88 y.o. female admitted on 2024 and has  has a past medical history of Asthma, CAD (coronary artery disease), Hypercalcemia, Hypertension, Hypokalemia, Obesity, and Osteoarthritis of knee.     This is a face to face encounter   24 afebrile confusion agitation looks red unable to get ROS      Patient is tolerating medications. No reported adverse drug reactions.  Available labs, imaging studies, microbiologic studies have been reviewed       Assessment & Plan     Admitted for   Hypernatremia [E87.0]  Acute cystitis without hematuria [N30.00]  Acute respiratory failure with hypoxia (HCC) [J96.01]  CHF (congestive heart failure), NYHA class I, acute, diastolic (HCC) [I50.31]  Altered mental status, unspecified altered mental status type [R41.82]  Pneumonia due to infectious organism, unspecified laterality, unspecified part of lung [J18.9]  Acute on chronic congestive heart failure, unspecified heart failure type (HCC) [I50.9]  Acute hypoxic respiratory failure (HCC) [J96.01]  ID following for   Cons bacteremia  Uti   ?pneumonia   Rash ?     Cx pending   Watch rash   ampicillin-sulbactam (UNASYN) 3,000 mg in sodium chloride 0.9 % 100 mL IVPB ([id number]), Q6H  azithromycin (ZITHROMAX) 500 mg in sodium chloride 0.9 % 250 mL IVPB (Vymi4Vse), Q24H    points):  FNA if >= 2.5 cm; follow-up if 1.5-2.4 cm in 1, 3, and 5 years TR2 (2 points):  No FNA or follow-up TR1 (0 points):  No FNA or follow-up ACR TI-RADS recommends that no more than two nodules with the highest ACR TI-RADS point total should be biopsied and no more than four nodules should be followed. RECOMMENDATIONS: Recommend FNA of the right thyroid nodule.     CT CHEST W CONTRAST    Result Date: 1/12/2024  1. Cardiomegaly with mild interstitial edema and small to moderate bilateral pleural effusions.  Findings are consistent with CHF exacerbation. 2. Moderate pericardial effusion. 3. Large, 6.5 x 5.8 x 8.7 cm right thyroid lobe nodule, which extends into the upper mediastinum an resulting in displacement of the trachea towards the left by approximately 1.3 cm.  Further evaluation with sonography and FNA recommended.     CT HEAD WO CONTRAST    Result Date: 1/12/2024  No acute intracranial abnormality. Mild, age-appropriate cerebral atrophy. Mild right maxillary acute sinusitis.     XR CHEST PORTABLE    Result Date: 1/12/2024  1. Bilateral lower lobe airspace disease, left greater than right, atelectasis versus pneumonia. 2. Mild congestive failure. 3. Soft tissue fullness in the right superior mediastinum with moderate displacement of the trachea towards the left. I suspect that this is a large right thyroid goiter with prominent substernal extension, but other superior mediastinal mass cannot be excluded. RECOMMENDATION: Recommend CT of the chest with contrast on a nonemergent basis to further characterize the right superior mediastinal mass.     Imaging and labs were reviewed per medical records.     Thank you for involving me in the care of Cris Mejia I will continue to follow. Please do not hesitate to call [phone] for any questions or concerns.    Electronically signed by Cheire Cuevas MD on 1/14/2024 at 5:22 PM

## 2024-01-14 NOTE — PROGRESS NOTES
Pt is unable to be alert enough for bedside swallow eval.  She seems to not know what to do with the water.  The water just ran out of her mouth when given a spoonful.

## 2024-01-14 NOTE — CONSULTS
INPATIENT CARDIOLOGY CONSULT    Name: Cris Mejia    Age: 88 y.o.    Date of Admission: 1/12/2024  1:24 PM    Date of Service: 1/13/2024    Reason for Consultation: CHF    Referring Physician: Matt Rodríguez DO    Primary Cardiologist: none    History of Present Illness:   Cris Mejia is a 88 y.o. female who presented on 1/12/2024 for further evaluation of shortness of breath and altered mental status, from a nursing home.  Baseline dementia unable to provide any history.  CT showing pulmonary edema, pleural effusions, and pericardial effusion.  In atrial fibrillation.    Review of Systems:   Unable to obtain    Past Medical History:  Past Medical History:   Diagnosis Date    Asthma     CAD (coronary artery disease)     Hypercalcemia     Hypertension     Hypokalemia     Obesity     Osteoarthritis of knee        Past Surgical History:  No past surgical history on file.    Family History:  No family history on file.    Social History:  Social History     Tobacco Use    Smoking status: Never       Allergies:  No Known Allergies    Home Medications:  Prior to Admission medications    Medication Sig Start Date End Date Taking? Authorizing Provider   Calcium-Magnesium-Vitamin D 600-300-400 LIQD Take by mouth daily Indications: 3 by mouth daily    Roberth Arellano MD   Cinnamon 500 MG CAPS Take 500 mg by mouth daily    Roberth Arellano MD   vitamin E 400 UNIT capsule Take 400 Units by mouth daily    Roberth Arellano MD   Omega-3 Fatty Acids (FISH OIL) 1200 MG CAPS Take 1,200 mg by mouth daily    Roberth Arellano MD   MULTIPLE VITAMIN PO Take by mouth daily    Roberth Arellano MD   vitamin B-12 (CYANOCOBALAMIN) 500 MCG tablet Take 500 mcg by mouth daily    Roberth Arellano MD   Ascorbic Acid (VITAMIN C) 500 MG CHEW Take 500 mg by mouth daily    Roberth Arellano MD   Zinc 50 MG TABS Take 50 mg by mouth daily    Robreth Arellano MD   albuterol sulfate HFA (PROVENTIL

## 2024-01-14 NOTE — PROGRESS NOTES
INPATIENT CARDIOLOGY FOLLOW-UP    Name: Cris Mejia    Age: 88 y.o.    Date of Admission: 1/12/2024  1:24 PM    Date of Service: 1/14/2024    Primary Cardiologist: None    Chief Complaint: Follow-up for CHF, pericardial effusion    Interim History:  Patient very confused, unable to answer questions and talking nonsensically.    I's and O's appear inaccurate    Review of Systems:   Unable to obtain    Problem List:  Patient Active Problem List   Diagnosis    Acute respiratory failure with hypoxia (HCC)    CHF (congestive heart failure), NYHA class I, acute, diastolic (HCC)       Current Medications:    Current Facility-Administered Medications:     perflutren lipid microspheres (DEFINITY) injection 1.5 mL, 1.5 mL, IntraVENous, ONCE PRN, Matt Rodríguez, DO    perflutren lipid microspheres (DEFINITY) injection 1.5 mL, 1.5 mL, IntraVENous, ONCE PRN, Matt Rodríguez, DO    ampicillin-sulbactam (UNASYN) 3,000 mg in sodium chloride 0.9 % 100 mL IVPB (Bsyt9Ajh), 3,000 mg, IntraVENous, Q6H, Cherie Cuevas MD, Stopped at 01/14/24 0730    azithromycin (ZITHROMAX) 500 mg in sodium chloride 0.9 % 250 mL IVPB ([id number]), 500 mg, IntraVENous, Q24H, Cherie Cuevas MD, Stopped at 01/14/24 0205    sodium chloride flush 0.9 % injection 5-40 mL, 5-40 mL, IntraVENous, 2 times per day, Matt Rodríguez, DO, 10 mL at 01/13/24 1125    sodium chloride flush 0.9 % injection 5-40 mL, 5-40 mL, IntraVENous, PRN, Matt Rodríguez, DO    0.9 % sodium chloride infusion, , IntraVENous, PRN, Matt Rodríguez, DO    ondansetron (ZOFRAN-ODT) disintegrating tablet 4 mg, 4 mg, Oral, Q8H PRN **OR** ondansetron (ZOFRAN) injection 4 mg, 4 mg, IntraVENous, Q6H PRN, Matt Rodríguez, DO    polyethylene glycol (GLYCOLAX) packet 17 g, 17 g, Oral, Daily PRN, Matt Rodríguez, DO    acetaminophen (TYLENOL) tablet 650 mg, 650 mg, Oral, Q6H PRN **OR** acetaminophen (TYLENOL) suppository 650 mg, 650 mg, Rectal, Q6H PRN, Matt Rodríguez, , 650 mg

## 2024-01-15 ENCOUNTER — APPOINTMENT (OUTPATIENT)
Age: 89
DRG: 291 | End: 2024-01-15
Attending: FAMILY MEDICINE
Payer: MEDICARE

## 2024-01-15 LAB
ANION GAP SERPL CALCULATED.3IONS-SCNC: 8 MMOL/L (ref 7–16)
BASOPHILS # BLD: 0 K/UL (ref 0–0.2)
BASOPHILS NFR BLD: 0 % (ref 0–2)
BUN SERPL-MCNC: 51 MG/DL (ref 6–23)
CALCIUM SERPL-MCNC: 8.8 MG/DL (ref 8.6–10.2)
CHLORIDE SERPL-SCNC: 109 MMOL/L (ref 98–107)
CO2 SERPL-SCNC: 33 MMOL/L (ref 22–29)
CREAT SERPL-MCNC: 1.2 MG/DL (ref 0.5–1)
ECHO AV AREA PEAK VELOCITY: 0.6 CM2
ECHO AV AREA VTI: 0.6 CM2
ECHO AV AREA/BSA PEAK VELOCITY: 0.3 CM2/M2
ECHO AV AREA/BSA VTI: 0.3 CM2/M2
ECHO AV CUSP MM: 0.9 CM
ECHO AV MEAN GRADIENT: 20 MMHG
ECHO AV MEAN VELOCITY: 2.1 M/S
ECHO AV PEAK GRADIENT: 30 MMHG
ECHO AV PEAK VELOCITY: 2.8 M/S
ECHO AV VELOCITY RATIO: 0.21
ECHO AV VTI: 49.4 CM
ECHO BSA: 1.99 M2
ECHO EST RA PRESSURE: 3 MMHG
ECHO LA DIAMETER INDEX: 2.06 CM/M2
ECHO LA DIAMETER: 4 CM
ECHO LA VOL A-L A2C: 82 ML (ref 22–52)
ECHO LA VOL A-L A4C: 62 ML (ref 22–52)
ECHO LA VOL MOD A2C: 75 ML (ref 22–52)
ECHO LA VOL MOD A4C: 54 ML (ref 22–52)
ECHO LA VOLUME AREA LENGTH: 76 ML
ECHO LA VOLUME INDEX A-L A2C: 42 ML/M2 (ref 16–34)
ECHO LA VOLUME INDEX A-L A4C: 32 ML/M2 (ref 16–34)
ECHO LA VOLUME INDEX AREA LENGTH: 39 ML/M2 (ref 16–34)
ECHO LA VOLUME INDEX MOD A2C: 39 ML/M2 (ref 16–34)
ECHO LA VOLUME INDEX MOD A4C: 28 ML/M2 (ref 16–34)
ECHO LV EJECTION FRACTION A2C: 50 %
ECHO LV EJECTION FRACTION A4C: 42 %
ECHO LV FRACTIONAL SHORTENING: 21 % (ref 28–44)
ECHO LV INTERNAL DIMENSION DIASTOLE INDEX: 2.73 CM/M2
ECHO LV INTERNAL DIMENSION DIASTOLIC: 5.3 CM (ref 3.9–5.3)
ECHO LV INTERNAL DIMENSION SYSTOLIC INDEX: 2.16 CM/M2
ECHO LV INTERNAL DIMENSION SYSTOLIC: 4.2 CM
ECHO LV ISOVOLUMETRIC RELAXATION TIME (IVRT): 60.6 MS
ECHO LV IVSD: 1.3 CM (ref 0.6–0.9)
ECHO LV MASS 2D: 242 G (ref 67–162)
ECHO LV MASS INDEX 2D: 124.7 G/M2 (ref 43–95)
ECHO LV POSTERIOR WALL DIASTOLIC: 1 CM (ref 0.6–0.9)
ECHO LV RELATIVE WALL THICKNESS RATIO: 0.38
ECHO LVOT AREA: 2.8 CM2
ECHO LVOT AV VTI INDEX: 0.21
ECHO LVOT DIAM: 1.9 CM
ECHO LVOT MEAN GRADIENT: 1 MMHG
ECHO LVOT PEAK GRADIENT: 1 MMHG
ECHO LVOT PEAK VELOCITY: 0.6 M/S
ECHO LVOT STROKE VOLUME INDEX: 15 ML/M2
ECHO LVOT SV: 29.2 ML
ECHO LVOT VTI: 10.3 CM
ECHO MV A VELOCITY: 0.35 M/S
ECHO MV AREA PHT: 4.2 CM2
ECHO MV AREA VTI: 1.5 CM2
ECHO MV E DECELERATION TIME (DT): 225.2 MS
ECHO MV E VELOCITY: 1.25 M/S
ECHO MV E/A RATIO: 3.57
ECHO MV LVOT VTI INDEX: 1.93
ECHO MV MAX VELOCITY: 1.2 M/S
ECHO MV MEAN GRADIENT: 3 MMHG
ECHO MV MEAN VELOCITY: 0.8 M/S
ECHO MV PEAK GRADIENT: 6 MMHG
ECHO MV PRESSURE HALF TIME (PHT): 52.3 MS
ECHO MV VTI: 19.9 CM
ECHO PV MAX VELOCITY: 0.9 M/S
ECHO PV MEAN GRADIENT: 2 MMHG
ECHO PV MEAN VELOCITY: 0.7 M/S
ECHO PV PEAK GRADIENT: 3 MMHG
ECHO PV VTI: 19.6 CM
ECHO RIGHT VENTRICULAR SYSTOLIC PRESSURE (RVSP): 41 MMHG
ECHO RV INTERNAL DIMENSION: 3 CM
ECHO TV REGURGITANT MAX VELOCITY: 3.09 M/S
ECHO TV REGURGITANT PEAK GRADIENT: 38 MMHG
EOSINOPHIL # BLD: 0.13 K/UL (ref 0.05–0.5)
EOSINOPHILS RELATIVE PERCENT: 1 % (ref 0–6)
ERYTHROCYTE [DISTWIDTH] IN BLOOD BY AUTOMATED COUNT: 15.1 % (ref 11.5–15)
GFR SERPL CREATININE-BSD FRML MDRD: 43 ML/MIN/1.73M2
GLUCOSE SERPL-MCNC: 152 MG/DL (ref 74–99)
HCT VFR BLD AUTO: 43.8 % (ref 34–48)
HGB BLD-MCNC: 12.9 G/DL (ref 11.5–15.5)
L PNEUMO1 AG UR QL IA.RAPID: NEGATIVE
LYMPHOCYTES NFR BLD: 0 K/UL (ref 1.5–4)
LYMPHOCYTES RELATIVE PERCENT: 0 % (ref 20–42)
MAGNESIUM SERPL-MCNC: 2.2 MG/DL (ref 1.6–2.6)
MCH RBC QN AUTO: 28.5 PG (ref 26–35)
MCHC RBC AUTO-ENTMCNC: 29.5 G/DL (ref 32–34.5)
MCV RBC AUTO: 96.7 FL (ref 80–99.9)
MICROORGANISM SPEC CULT: ABNORMAL
MICROORGANISM SPEC CULT: NORMAL
MICROORGANISM/AGENT SPEC: ABNORMAL
MONOCYTES NFR BLD: 0.52 K/UL (ref 0.1–0.95)
MONOCYTES NFR BLD: 4 % (ref 2–12)
NEUTROPHILS NFR BLD: 96 % (ref 43–80)
NEUTS SEG NFR BLD: 14.25 K/UL (ref 1.8–7.3)
PLATELET # BLD AUTO: 136 K/UL (ref 130–450)
PMV BLD AUTO: 12 FL (ref 7–12)
POTASSIUM SERPL-SCNC: 5.1 MMOL/L (ref 3.5–5)
RBC # BLD AUTO: 4.53 M/UL (ref 3.5–5.5)
RBC # BLD: ABNORMAL 10*6/UL
S PNEUM AG SPEC QL: NEGATIVE
SERVICE CMNT-IMP: ABNORMAL
SODIUM SERPL-SCNC: 150 MMOL/L (ref 132–146)
SPECIMEN DESCRIPTION: ABNORMAL
SPECIMEN DESCRIPTION: NORMAL
SPECIMEN SOURCE: NORMAL
WBC OTHER # BLD: 14.9 K/UL (ref 4.5–11.5)

## 2024-01-15 PROCEDURE — 2580000003 HC RX 258: Performed by: FAMILY MEDICINE

## 2024-01-15 PROCEDURE — 2500000003 HC RX 250 WO HCPCS

## 2024-01-15 PROCEDURE — 6370000000 HC RX 637 (ALT 250 FOR IP): Performed by: FAMILY MEDICINE

## 2024-01-15 PROCEDURE — 83735 ASSAY OF MAGNESIUM: CPT

## 2024-01-15 PROCEDURE — 97530 THERAPEUTIC ACTIVITIES: CPT | Performed by: PHYSICAL THERAPIST

## 2024-01-15 PROCEDURE — 99232 SBSQ HOSP IP/OBS MODERATE 35: CPT | Performed by: INTERNAL MEDICINE

## 2024-01-15 PROCEDURE — 93306 TTE W/DOPPLER COMPLETE: CPT

## 2024-01-15 PROCEDURE — 2060000000 HC ICU INTERMEDIATE R&B

## 2024-01-15 PROCEDURE — 2580000003 HC RX 258: Performed by: SPECIALIST

## 2024-01-15 PROCEDURE — 6360000002 HC RX W HCPCS: Performed by: FAMILY MEDICINE

## 2024-01-15 PROCEDURE — 97161 PT EVAL LOW COMPLEX 20 MIN: CPT | Performed by: PHYSICAL THERAPIST

## 2024-01-15 PROCEDURE — 6360000002 HC RX W HCPCS: Performed by: SPECIALIST

## 2024-01-15 PROCEDURE — 92610 EVALUATE SWALLOWING FUNCTION: CPT | Performed by: SPEECH-LANGUAGE PATHOLOGIST

## 2024-01-15 PROCEDURE — 80048 BASIC METABOLIC PNL TOTAL CA: CPT

## 2024-01-15 PROCEDURE — 93005 ELECTROCARDIOGRAM TRACING: CPT | Performed by: FAMILY MEDICINE

## 2024-01-15 PROCEDURE — 93306 TTE W/DOPPLER COMPLETE: CPT | Performed by: INTERNAL MEDICINE

## 2024-01-15 PROCEDURE — 85025 COMPLETE CBC W/AUTO DIFF WBC: CPT

## 2024-01-15 PROCEDURE — 36415 COLL VENOUS BLD VENIPUNCTURE: CPT

## 2024-01-15 PROCEDURE — 2700000000 HC OXYGEN THERAPY PER DAY

## 2024-01-15 RX ORDER — METOPROLOL TARTRATE 1 MG/ML
5 INJECTION, SOLUTION INTRAVENOUS EVERY 6 HOURS
Status: DISCONTINUED | OUTPATIENT
Start: 2024-01-15 | End: 2024-01-18

## 2024-01-15 RX ORDER — METOPROLOL TARTRATE 1 MG/ML
5 INJECTION, SOLUTION INTRAVENOUS ONCE
Status: DISCONTINUED | OUTPATIENT
Start: 2024-01-15 | End: 2024-01-15

## 2024-01-15 RX ORDER — DEXAMETHASONE SODIUM PHOSPHATE 10 MG/ML
10 INJECTION, SOLUTION INTRAMUSCULAR; INTRAVENOUS ONCE
Status: COMPLETED | OUTPATIENT
Start: 2024-01-15 | End: 2024-01-15

## 2024-01-15 RX ORDER — METOPROLOL TARTRATE 1 MG/ML
INJECTION, SOLUTION INTRAVENOUS
Status: COMPLETED
Start: 2024-01-15 | End: 2024-01-15

## 2024-01-15 RX ORDER — DIPHENHYDRAMINE HYDROCHLORIDE 50 MG/ML
50 INJECTION INTRAMUSCULAR; INTRAVENOUS EVERY 6 HOURS PRN
Status: DISCONTINUED | OUTPATIENT
Start: 2024-01-15 | End: 2024-01-18 | Stop reason: HOSPADM

## 2024-01-15 RX ADMIN — ACETAMINOPHEN 650 MG: 650 SUPPOSITORY RECTAL at 15:08

## 2024-01-15 RX ADMIN — SODIUM CHLORIDE, PRESERVATIVE FREE 10 ML: 5 INJECTION INTRAVENOUS at 14:51

## 2024-01-15 RX ADMIN — ENOXAPARIN SODIUM 40 MG: 100 INJECTION SUBCUTANEOUS at 10:03

## 2024-01-15 RX ADMIN — AZITHROMYCIN MONOHYDRATE 500 MG: 500 INJECTION, POWDER, LYOPHILIZED, FOR SOLUTION INTRAVENOUS at 20:32

## 2024-01-15 RX ADMIN — SODIUM CHLORIDE 3000 MG: 9 INJECTION, SOLUTION INTRAVENOUS at 06:47

## 2024-01-15 RX ADMIN — DEXTROSE AND SODIUM CHLORIDE: 5; 450 INJECTION, SOLUTION INTRAVENOUS at 06:47

## 2024-01-15 RX ADMIN — METOPROLOL TARTRATE 5 MG: 5 INJECTION INTRAVENOUS at 18:04

## 2024-01-15 RX ADMIN — DIPHENHYDRAMINE HYDROCHLORIDE 50 MG: 50 INJECTION INTRAMUSCULAR; INTRAVENOUS at 14:50

## 2024-01-15 RX ADMIN — SODIUM CHLORIDE, PRESERVATIVE FREE 10 ML: 5 INJECTION INTRAVENOUS at 10:21

## 2024-01-15 RX ADMIN — DEXTROSE AND SODIUM CHLORIDE: 5; 450 INJECTION, SOLUTION INTRAVENOUS at 10:20

## 2024-01-15 RX ADMIN — DEXAMETHASONE SODIUM PHOSPHATE 10 MG: 10 INJECTION, SOLUTION INTRAMUSCULAR; INTRAVENOUS at 14:50

## 2024-01-15 RX ADMIN — VANCOMYCIN HYDROCHLORIDE 1000 MG: 1 INJECTION, POWDER, LYOPHILIZED, FOR SOLUTION INTRAVENOUS at 15:01

## 2024-01-15 RX ADMIN — SODIUM CHLORIDE 3000 MG: 9 INJECTION, SOLUTION INTRAVENOUS at 13:54

## 2024-01-15 ASSESSMENT — ENCOUNTER SYMPTOMS
VOMITING: 0
NAUSEA: 0
SHORTNESS OF BREATH: 1

## 2024-01-15 NOTE — ACP (ADVANCE CARE PLANNING)
Advance Care Planning   Healthcare Decision Maker:    Primary Decision Maker: Bienvenido Mejia Jr. - Child - [phone]

## 2024-01-15 NOTE — DISCHARGE INSTR - COC
Continuity of Care Form    Patient Name: Cris Mejia   :  1935  MRN:  [id number]    Admit date:  2024  Discharge date:  ***    Code Status Order: DNR-CCA   Advance Directives:     Admitting Physician:  Matt Rodríguez DO  PCP: Matt Rodríguez DO    Discharging Nurse: ***  Discharging Hospital Unit/Room#: 0623/0623-02  Discharging Unit Phone Number: ***    Emergency Contact:   Extended Emergency Contact Information  Primary Emergency Contact: Bienvenido Mejia Jr.  Home Phone: [phone]  Mobile Phone: [phone]  Relation: Child    Past Surgical History:  No past surgical history on file.    Immunization History:     There is no immunization history on file for this patient.    Active Problems:  Patient Active Problem List   Diagnosis Code    Acute respiratory failure with hypoxia ([facility]) J96.01    CHF (congestive heart failure), NYHA class I, acute, diastolic ([facility]) I50.31       Isolation/Infection:   Isolation            No Isolation          Patient Infection Status       None to display                     Nurse Assessment:  Last Vital Signs: /80   Pulse 69   Temp 100.2 °F (37.9 °C) (Axillary)   Resp 24   Ht 1.651 m (5' 5\")   Wt 86.6 kg (191 lb)   SpO2 95%   BMI 31.78 kg/m²     Last documented pain score (0-10 scale):    Last Weight:   Wt Readings from Last 1 Encounters:   01/15/24 86.6 kg (191 lb)     Mental Status:  {IP PT MENTAL STATUS:45445}    IV Access:  { BORA IV ACCESS:458993969}    Nursing Mobility/ADLs:  Walking   {CHP DME ADLs:442260202}  Transfer  {CHP DME ADLs:305708372}  Bathing  {CHP DME ADLs:030547470}  Dressing  {CHP DME ADLs:284960788}  Toileting  {CHP DME ADLs:827556123}  Feeding  {CHP DME ADLs:994400134}  Med Admin  {CHP DME ADLs:417205452}  Med Delivery   { BORA MED Delivery:547593027}    Wound Care Documentation and Therapy:        Elimination:  Continence:   Bowel: {YES / NO:}  Bladder: {YES / NO:}  Urinary Catheter: {Urinary Catheter:245120075}

## 2024-01-15 NOTE — PLAN OF CARE
Problem: Discharge Planning  Goal: Discharge to home or other facility with appropriate resources  Outcome: Progressing  Flowsheets (Taken 1/14/2024 2000)  Discharge to home or other facility with appropriate resources:   Identify barriers to discharge with patient and caregiver   Arrange for needed discharge resources and transportation as appropriate   Identify discharge learning needs (meds, wound care, etc)   Refer to discharge planning if patient needs post-hospital services based on physician order or complex needs related to functional status, cognitive ability or social support system     Problem: Skin/Tissue Integrity  Goal: Absence of new skin breakdown  Description: 1.  Monitor for areas of redness and/or skin breakdown  2.  Assess vascular access sites hourly  3.  Every 4-6 hours minimum:  Change oxygen saturation probe site  4.  Every 4-6 hours:  If on nasal continuous positive airway pressure, respiratory therapy assess nares and determine need for appliance change or resting period.  Outcome: Progressing     Problem: ABCDS Injury Assessment  Goal: Absence of physical injury  Outcome: Progressing  Flowsheets (Taken 1/14/2024 2000)  Absence of Physical Injury: Implement safety measures based on patient assessment     Problem: Pain  Goal: Verbalizes/displays adequate comfort level or baseline comfort level  Outcome: Progressing     Problem: Safety - Adult  Goal: Free from fall injury  Outcome: Progressing  Flowsheets (Taken 1/14/2024 2000)  Free From Fall Injury: Instruct family/caregiver on patient safety     Problem: Nutrition Deficit:  Goal: Optimize nutritional status  Outcome: Progressing     Problem: Confusion  Goal: Confusion, delirium, dementia, or psychosis is improved or at baseline  Description: INTERVENTIONS:  1. Assess for possible contributors to thought disturbance, including medications, impaired vision or hearing, underlying metabolic abnormalities, dehydration, psychiatric diagnoses,

## 2024-01-15 NOTE — PROGRESS NOTES
Physical Therapy    Physical Therapy Initial Evaluation/Plan of Care    Room #:  0623/0623-02  Patient Name: Cris Mejia  YOB: 1935  MRN: [id number]    Date of Service: 1/15/2024     Tentative placement recommendation: Subacute Rehab  Equipment recommendation: To be determined      Evaluating Physical Therapist: Kvng Burrows, PT  #[id number]      Specific Provider Orders/Date/Referring Provider :     PT eval and treat  Start:  01/15/24 0900,   End:  01/15/24 0900,   ONE TIME,   Standing Count:  1 Occurrences,   R       Verally, Matt F, DO    Admitting Diagnosis:   Hypernatremia [E87.0]  Acute cystitis without hematuria [N30.00]  Acute respiratory failure with hypoxia (HCC) [J96.01]  CHF (congestive heart failure), NYHA class I, acute, diastolic (HCC) [I50.31]  Altered mental status, unspecified altered mental status type [R41.82]  Pneumonia due to infectious organism, unspecified laterality, unspecified part of lung [J18.9]  Acute on chronic congestive heart failure, unspecified heart failure type (HCC) [I50.9]  Acute hypoxic respiratory failure (HCC) [J96.01]    Admitted with    shortness of breath , hypoxia, altered mental status   Assistive living dementia  Surgery: none  Visit Diagnoses         Codes    Acute hypoxic respiratory failure (HCC)    -  Primary J96.01    Acute on chronic congestive heart failure, unspecified heart failure type (HCC)     I50.9    Pneumonia due to infectious organism, unspecified laterality, unspecified part of lung     J18.9    Acute cystitis without hematuria     N30.00    Altered mental status, unspecified altered mental status type     R41.82    Hypernatremia     E87.0    Thyroid nodule greater than or equal to 1.5 cm in diameter incidentally noted on imaging study     E04.1            Patient Active Problem List   Diagnosis    Acute respiratory failure with hypoxia (HCC)    CHF (congestive heart failure), NYHA class I, acute, diastolic (HCC)        ASSESSMENT  tolerated, falls, alarm, and O2 ,  4 Liters of o2 via nasal cannula     Social history: Patient lives in an assisted living facility     Equipment owned: unknown,       AM-PAC Basic Mobility        AM-PAC Basic Mobility - Inpatient   How much help is needed turning from your back to your side while in a flat bed without using bedrails?: Total  How much help is needed moving from lying on your back to sitting on the side of a flat bed without using bedrails?: Total  How much help is needed moving to and from a bed to a chair?: Total  How much help is needed standing up from a chair using your arms?: Total  How much help is needed walking in hospital room?: Total  How much help is needed climbing 3-5 steps with a railing?: Total  AM-PAC Inpatient Mobility Raw Score : 6  AM-PAC Inpatient T-Scale Score : 23.55  Mobility Inpatient CMS 0-100% Score: 100  Mobility Inpatient CMS G-Code Modifier : CN    Nursing cleared patient for PT evaluation. The admitting diagnosis and active problem list as listed above have been reviewed prior to the initiation of this evaluation.    OBJECTIVE:   Initial Evaluation  Date: 1/15/2024 Treatment Date:     Short Term/ Long Term   Goals   Was pt agreeable to Eval/treatment? Yes  To be met in 3 days   Pain level   0/10        Bed Mobility  Using rails and head of bed elevated:     Rolling: Maximal assist of 1    Supine to sit: Dependent assist of 1    Sit to supine: Dependent assist of 1    Scooting: Dependent assist of 1    Rolling: Moderate assist of 1    Supine to sit: Moderate assist of 1    Sit to supine: Moderate assist of 1    Scooting: Moderate assist of 1     Transfers Sit to stand: Not assessed     Sit to stand: Maximal assist of 1     Ambulation    not assessed     10 feet using  wheeled walker with Minimal assist of 1    ROM Within functional limits    Increase range of motion 10% of affected joints    Strength BUE:   grossly 3/5  RLE:  grossly 3/5  LLE:  grossly 3/5  Increase

## 2024-01-15 NOTE — PROGRESS NOTES
be biopsied and no more than four nodules should be followed. RECOMMENDATIONS: Recommend FNA of the right thyroid nodule.     CT CHEST W CONTRAST    Result Date: 1/12/2024  1. Cardiomegaly with mild interstitial edema and small to moderate bilateral pleural effusions.  Findings are consistent with CHF exacerbation. 2. Moderate pericardial effusion. 3. Large, 6.5 x 5.8 x 8.7 cm right thyroid lobe nodule, which extends into the upper mediastinum an resulting in displacement of the trachea towards the left by approximately 1.3 cm.  Further evaluation with sonography and FNA recommended.     CT HEAD WO CONTRAST    Result Date: 1/12/2024  No acute intracranial abnormality. Mild, age-appropriate cerebral atrophy. Mild right maxillary acute sinusitis.     XR CHEST PORTABLE    Result Date: 1/12/2024  1. Bilateral lower lobe airspace disease, left greater than right, atelectasis versus pneumonia. 2. Mild congestive failure. 3. Soft tissue fullness in the right superior mediastinum with moderate displacement of the trachea towards the left. I suspect that this is a large right thyroid goiter with prominent substernal extension, but other superior mediastinal mass cannot be excluded. RECOMMENDATION: Recommend CT of the chest with contrast on a nonemergent basis to further characterize the right superior mediastinal mass.     Imaging and labs were reviewed per medical records.     Thank you for involving me in the care of Cris AMADA BarbozaMejia I will continue to follow. Please do not hesitate to call [phone] for any questions or concerns.    Electronically signed by Cherie Cuevas MD on 1/15/2024 at 2:03 PM

## 2024-01-15 NOTE — PROGRESS NOTES
Attempted to see patient at the bedside for HF education session. Patient resting with eyes closed. No family at bedside. RN taking care of patient states patient continues to have confusion, her son Bienvenido is to come in later today. HF education folder left at the bedside. Will continue to follow.

## 2024-01-15 NOTE — PROGRESS NOTES
SPEECH/LANGUAGE PATHOLOGY  CLINICAL ASSESSMENT OF SWALLOWING FUNCTION   and PLAN OF CARE    PATIENT NAME:  Cris Mejia  (female)     MRN:  [id number]    :  1935  (88 y.o.)  STATUS:  Inpatient: Room 0623-    TODAY'S DATE:  1/15/2024  REFERRING PROVIDER:   Dr. Matt Rodríguez  SPECIFIC PROVIDER ORDER: SLP eval and treat Date of order:  24  REASON FOR REFERRAL: dysphagia, speech pathology from [facility] recommended barium swallow   EVALUATING THERAPIST: RK Jung                 RESULTS:    DYSPHAGIA DIAGNOSIS:   Clinical indicators of moderate oropharyngeal phase dysphagia       DIET RECOMMENDATIONS:  NPO until MBSS can be completed patient alert this afternoon however did not consistently recognize when items where placed in her mouth and did not attempt to swallow        FEEDING RECOMMENDATIONS:     Assistance level:  Not applicable      Compensatory strategies recommended: No strategies are recommended at this time      Discussed recommendations with nursing and/or faxed report to referring provider: Yes    SPEECH THERAPY  PLAN OF CARE   The dysphagia POC is established based on physician order, dysphagia diagnosis and results of clinical assessment     Will establish POC once MBSS is completed.    Conditions Requiring Skilled Therapeutic Intervention for dysphagia:    Impaired initiation of the swallow needed verbal cues, frequently no awareness when items where placed in her mouth and with excessive talking with items in her mouth    Specific dysphagia interventions to include:     MBSS to fully assess oropharyngeal swallow function and to assist in determining the least restrictive PO diet to maintain adequate nutrition/hydration     Specific instructions for next treatment:  MBSS to be completed  Patient Treatment Goals:    Short Term Goals:  Pt will participate in MBSS to fully assess oropharyngeal swallow function and to assist in determining the least restrictive PO diet to maintain

## 2024-01-15 NOTE — CARE COORDINATION
Case Management Assessment  Initial Evaluation    Date/Time of Evaluation: 1/15/2024 3:55 PM  Assessment Completed by: Kalie Yung RN    If patient is discharged prior to next notation, then this note serves as note for discharge by case management.    Patient Name: Cris Mejia                   YOB: 1935  Diagnosis: Hypernatremia [E87.0]  Acute cystitis without hematuria [N30.00]  Acute respiratory failure with hypoxia (HCC) [J96.01]  CHF (congestive heart failure), NYHA class I, acute, diastolic (HCC) [I50.31]  Altered mental status, unspecified altered mental status type [R41.82]  Pneumonia due to infectious organism, unspecified laterality, unspecified part of lung [J18.9]  Acute on chronic congestive heart failure, unspecified heart failure type (HCC) [I50.9]  Acute hypoxic respiratory failure (HCC) [J96.01]                   Date / Time: 1/12/2024  1:24 PM    Patient Admission Status: Inpatient   Readmission Risk (Low < 19, Mod (19-27), High > 27): Readmission Risk Score: 10.1    Current PCP: Matt Rodríguez, DO  PCP verified by CM? Yes    Chart Reviewed: Yes      History Provided by: Child/Family  Patient Orientation: Unable to Assess (pt not responsive)    Patient Cognition: Other (see comment) (unresponsive)    Hospitalization in the last 30 days (Readmission):  No    If yes, Readmission Assessment in CM Navigator will be completed.    Advance Directives:      Code Status: DNR-CCA   Patient's Primary Decision Maker is: Legal Next of Kin    Primary Decision Maker: Bienvenido Mejia Jr. - Child - [phone]    Discharge Planning:    Patient lives with: Other (Comment) Type of Home: Skilled Nursing Facility  Primary Care Giver: Other (Comment) ([facility] staff)  Patient Support Systems include: Children   Current Financial resources:    Current community resources:    Current services prior to admission: Skilled Nursing Facility            Current DME:              Type of Home Care services:   at [facility].  CM attempted to update Di BOOKER DON and left her a VM ([phone] [phone]).  PT/OT have been ordered.  CM will follow. Electronically signed by Kalie Yung RN on 1/15/2024 at 4:02 PM                                                   Kalie Yung RN  Case Management Department

## 2024-01-15 NOTE — PROGRESS NOTES
Spoke to pt's son, Bienvenido.  He requests that a villaseñor catheter not be placed if at all possible.

## 2024-01-15 NOTE — PROGRESS NOTES
INPATIENT CARDIOLOGY FOLLOW-UP    Name: Cris Mejia    Age: 88 y.o.    Date of Admission: 1/12/2024  1:24 PM    Date of Service: 1/15/2024    Primary Cardiologist: None    Chief Complaint: Follow-up for CHF, pericardial effusion    Interim History:  Patient remains very confused, unable to answer questions and talking nonsensically.    I's and O's appear inaccurate.  Now on IV fluids, sodium increasing.    Review of Systems:   Unable to obtain    Problem List:  Patient Active Problem List   Diagnosis    Acute respiratory failure with hypoxia (HCC)    CHF (congestive heart failure), NYHA class I, acute, diastolic (HCC)       Current Medications:    Current Facility-Administered Medications:     dextrose 5 % and 0.45 % sodium chloride infusion, , IntraVENous, Continuous, Matt Rodríguez, DO, Last Rate: 100 mL/hr at 01/15/24 0647, New Bag at 01/15/24 0647    perflutren lipid microspheres (DEFINITY) injection 1.5 mL, 1.5 mL, IntraVENous, ONCE PRN, Matt Rodríguez, DO    perflutren lipid microspheres (DEFINITY) injection 1.5 mL, 1.5 mL, IntraVENous, ONCE PRN, Cam Rodríguezry F, DO    ampicillin-sulbactam (UNASYN) 3,000 mg in sodium chloride 0.9 % 100 mL IVPB (Gksw5Azk), 3,000 mg, IntraVENous, Q6H, Cherie Cuevas MD, Stopped at 01/15/24 0749    azithromycin (ZITHROMAX) 500 mg in sodium chloride 0.9 % 250 mL IVPB (Pyud8Pwc), 500 mg, IntraVENous, Q24H, Cherie Cuevas MD, Stopped at 01/14/24 2224    sodium chloride flush 0.9 % injection 5-40 mL, 5-40 mL, IntraVENous, 2 times per day, Matt Rodríguez F, DO, 10 mL at 01/14/24 2121    sodium chloride flush 0.9 % injection 5-40 mL, 5-40 mL, IntraVENous, PRN, Matt Rodríguez F, DO    0.9 % sodium chloride infusion, , IntraVENous, PRN, Matt Rodríguez F, DO    ondansetron (ZOFRAN-ODT) disintegrating tablet 4 mg, 4 mg, Oral, Q8H PRN **OR** ondansetron (ZOFRAN) injection 4 mg, 4 mg, IntraVENous, Q6H PRN, Matt Rodríguez DO    polyethylene glycol (GLYCOLAX) packet 17 g,  bilaterally    Intake/Output:    Intake/Output Summary (Last 24 hours) at 1/15/2024 0959  Last data filed at 1/15/2024 0649  Gross per 24 hour   Intake 0 ml   Output 900 ml   Net -900 ml     No intake/output data recorded.    Laboratory Tests:  Recent Labs     24  0542 24  0836 01/15/24  0738   * 151* 150*   K 5.2* 4.5 5.1*    109* 109*   CO2 29 30* 33*   BUN 27* 41* 51*   CREATININE 0.8 1.1* 1.2*   GLUCOSE 126* 74 152*   CALCIUM 8.7 8.9 8.8     Lab Results   Component Value Date/Time    MG 2.2 01/15/2024 07:38 AM     Recent Labs     24  1430   ALKPHOS 92   ALT 18   AST 16   PROT 7.1   BILITOT 0.3   LABALBU 4.2     Recent Labs     24  1430 01/15/24  0755   WBC 9.6 14.9*   RBC 4.92 4.53   HGB 14.1 12.9   HCT 46.1 43.8   MCV 93.7 96.7   MCH 28.7 28.5   MCHC 30.6* 29.5*   RDW 14.7 15.1*    136   MPV 12.3* 12.0     No results found for: \"CKTOTAL\", \"CKMB\", \"CKMBINDEX\", \"TROPONINI\"  No results found for: \"INR\", \"PROTIME\"  No results found for: \"TSHFT4\", \"TSH\"  No results found for: \"LABA1C\"  No results found for: \"EAG\"  Lab Results   Component Value Date    CHOL 145 2024     Lab Results   Component Value Date    TRIG 88 2024     Lab Results   Component Value Date    HDL 49 2024     Lab Results   Component Value Date    LDLCHOLESTEROL 78 2024     Lab Results   Component Value Date    VLDL 18 2024     No results found for: \"CHOLHDLRATIO\"  Recent Labs     24  1430   PROBNP 2,602*       Cardiac Tests:    EK2024: Atrial fibrillation 83 bpm with left anterior fascicular block.  Anterior infarct.     Telemetry: AF 80s     CT chest:        Impression:         1. Cardiomegaly with mild interstitial edema and small to moderate bilateral  pleural effusions.  Findings are consistent with CHF exacerbation.  2. Moderate pericardial effusion.  3. Large, 6.5 x 5.8 x 8.7 cm right thyroid lobe nodule, which extends into  the upper mediastinum an

## 2024-01-15 NOTE — PROGRESS NOTES
Progress Note  Date:1/15/2024       Room:[facility]0623-  Patient Name:Cris Mejia     YOB: 1935     Age:88 y.o.        Subjective    Subjective:  Symptoms:  Stable.  She reports shortness of breath, malaise and weakness.  (Na 150, cr 1.2,  culture +,  more alert).    Diet:  NPO.  No nausea or vomiting.    Activity level: Impaired due to weakness.    Pain:  She reports no pain.     Review of Systems   Constitutional:  Negative for fever.   Respiratory:  Positive for shortness of breath.    Gastrointestinal:  Negative for nausea and vomiting.   Neurological:  Positive for weakness.   All other systems reviewed and are negative.  Objective         Vitals Last 24 Hours:  TEMPERATURE:  Temp  Av.1 °F (36.7 °C)  Min: 97.3 °F (36.3 °C)  Max: 98.9 °F (37.2 °C)  RESPIRATIONS RANGE: Resp  Av  Min: 18  Max: 22  PULSE OXIMETRY RANGE: SpO2  Av %  Min: 94 %  Max: 99 %  PULSE RANGE: Pulse  Av  Min: 89  Max: 109  BLOOD PRESSURE RANGE: Systolic (24hrs), Av , Min:127 , Max:129   ; Diastolic (24hrs), Av, Min:58, Max:81    I/O (24Hr):    Intake/Output Summary (Last 24 hours) at 1/15/2024 1322  Last data filed at 1/15/2024 1239  Gross per 24 hour   Intake 0 ml   Output 850 ml   Net -850 ml     Objective:  General Appearance:  Ill-appearing.    Vital signs: (most recent): Blood pressure (!) 127/58, pulse 96, temperature 98.1 °F (36.7 °C), temperature source Axillary, resp. rate 20, height 1.651 m (5' 5\"), weight 86.6 kg (191 lb), SpO2 94 %.  Vital signs are normal.  No fever.    Output: Producing urine and producing stool.    Lungs:  There are decreased breath sounds.    Heart: Normal rate.  Regular rhythm.    Abdomen: Abdomen is soft.  Bowel sounds are normal.   There is no abdominal tenderness.     Neurological: Patient is alert.    Skin:  Dry.    Labs/Imaging/Diagnostics    Labs:  CBC:  Recent Labs     24  1430 01/15/24  0755   WBC 9.6 14.9*   RBC 4.92 4.53   HGB 14.1 12.9   HCT 46.1  class I, acute, diastolic (HCC) 1/12/2024 Yes     Assessment:    Condition: In stable condition.  Unchanged.   (No changes, more alert).     Plan:   Per physical therapy.   (Continue IV antibiotics  Gentle hydration  Speech therapy  Needs modified swallow  Labs in am  Off diuretics per cardiology).     Electronically signed by Matt Rodríguez DO on 1/15/24 at 1:22 PM EST

## 2024-01-15 NOTE — DISCHARGE INSTRUCTIONS
HEART FAILURE  / CONGESTIVE HEART FAILURE  DISCHARGE INSTRUCTIONS:  GUIDELINES TO FOLLOW AT HOME    Self- Managed Care:     MEDICATIONS:  Take your medication as directed. If you are experiencing any side effects, inform your doctor, Do not stop taking any of your medications without letting your doctor know.   Check with your doctor before taking any over-the-counter medications / herbal / or dietary supplements. They may interfere with your other medications.  Do not take ibuprofen (Advil or Motrin) and naproxen (Aleve) without talking to your doctor first. They could make your heart failure worse.         WEIGHT MONITORING:   Weigh yourself everyday (with the same scale) around the same time of the day and write it down. (you can chart them on a calendar or keep track of them on paper.   Notify your doctor of a weight gain of 3 pounds or more in 1 day   OR a total of 5 pounds or more in 1 week    Take your weight record to your doctor visits  Also, the same goes if you loose more than 3# in one day, let your heart doctor know.         DIET:   Cardiac heart healthy diet- Low saturated / low trans fat, no added salt, caffeine restricted, Low sodium diet-   No more than 2,000mg (2 grams) of salt / sodium per day (which equals to a little less than  a teaspoon of salt)  If your doctor wants you on a fluid restriction...it is usually recommended a fluid limit of 2,000cc -  Fluid restriction- 2,000 ml (milliliters) = 64 ounces = you can have 8 glasses of fluid per day (each glass 8 ounces)    Follow a low salt diet - avoid using salt at the table, avoid / limit use of canned soups, processed / packaged foods, salted snacks, olives and pickles.  Do not use a salt substitute without checking with your doctor, they may contain a high amount of potassioum. (Mrs. Dash is safe to use).    Limit the use of alcohol       CALL YOUR DOCTOR THE FIRST DAY YOU NOTICE ANY OF THESE   SYMPTOMS:  You have a  doctor whether you need another dose.       My Goal for Self-management of Heart Failure Includes 5 steps :    1. Notice a change in symptoms ( weight gain, short of breath, leg swelling, decreased activity level, bloating....)    2. Evaluate the change: (use the Heart Failure Zones )     3. Decide to take action: decide what your options are, such as: (call your doctor for an extra visit, take a prescribed medication, such as your water pill if your doctor has given you directions to do so, CALL YOUR DOCTOR)    4. Come up with a strategy:  (now you call the doctor for advice / appointment. This is where you take action!!!  Do not wait, catch the symptom early and treat it before it worsens.    5. Evaluate the response: The next day, check your Heart Failure Zones: are you in the GREEN ZONE (safe zone)?  Worsening symptoms of YELLOW ZONE? Or have you moved to the RED ZONE and need to call 911 or go to the Emergency Room for evaluation? Call your doctor's office to update them on your symptoms of heart failure.

## 2024-01-15 NOTE — CONSULTS
[facility]   Inpatient CHF Nurse Navigator Consult      Cardiologist: Dr. Sujit Lynch AMADA Mejia is a 88 y.o. (1935) female with a history of HFmrEF, most recent EF:  No results found for: \"LVEF\", \"LVEFMODE\"    Called and spoke with patient's son Bienvenido to review HF education due to patient confusion. Son states that patient lives in assisted living and does have dementia.  Agreeable to assisting with symptom monitoring, daily weights, and following a low sodium diet and follow up appointments with PCP, and [facility] Cardiology APRN.       Barriers identified during consult contributing to HF Hospitalization:  [x] Limited medication adherence   [x] Poor health literacy, education regarding HF medications provided   [] Pill box provided to patient  [] Difficulty affording medications  [] Prescription assistance information given     [x] Not weighing themselves daily  [x] Weight log provided for easy monitoring  [] Scale provided     [x] Not following low sodium diet  [] Food insecurity   [x] 2 gram sodium diet education provided   [] Low sodium recipes provided  [] Sodium free seasoning provided   [] Low sodium meal delivery options given to patient  [x] Dietician consulted     [] Lack of transportation to appointments     [] Depression, given chronic illness  [] Primary team notified     [] Goals of care need addressed  [] Palliative care consulted     [] CHF CHW consulted, to assist with n/a        Chart Reviewed:  Diet: Diet NPO   Daily Weights: Patient Vitals for the past 96 hrs (Last 3 readings):   Weight   01/15/24 0847 86.6 kg (191 lb)   01/15/24 0600 87 kg (191 lb 12.8 oz)   01/13/24 0907 77.3 kg (170 lb 6.7 oz)     I/O:   Intake/Output Summary (Last 24 hours) at 1/15/2024 1620  Last data filed at 1/15/2024 1239  Gross per 24 hour   Intake 0 ml   Output 850 ml   Net -850 ml       [] Nursing staff/manager notified of inaccurate vigil weights or I/O      Discharge  week. Also, if you should have a significant weight loss of 3# or more in one day to call the doctor, they may need to decrease or hold the diuretic dose. On days you feels nauseated and not eating / drinking, having emesis or diarrhea,  informed to call the cardiologist  / doctor, they may need to decrease or hold diuretic to avoid dehydration.  Again, stressed the importance of informing their medical provider the first day of onset of any of the signs and symptoms in the \"Yellow Zone\" of the HF Zones.     The Heart Failure Booklet given to the patient with additional patient education addressing:  What is Heart Failure?  Things You Can Do to Live Well with HF  How to Take Your Medications  How to Eat Less Salt  [place] its Salt?  Exercising Well with Heart Failure  Signs and symptoms of HF to report  Weight Yourself Each Day  Home Self Management- activity, weight tracking, taking medications as prescribed, meals /diet planning (sodium and fluid restriction), how to read food labels, keeping physician follow ups, smoking cessation, follow the “Heart Failure Zones”  The Heart Failure zones  Every Dose Every Day    Instructed to call 911 if you have any of the following symptoms:  Struggling to breathe unrelieved with rest  Having chest pain  Confusion or can’t think clearly      Patient verbalizes understanding of above.   Greater than 30 minutes was spent educating patient.        Alyssa Espinosa RN   Heart Failure Navigator

## 2024-01-15 NOTE — PLAN OF CARE
Patient's chart updated to reflect:        - HF discharge instructions.  -Orders: , daily weights, I/O.  -PCP and cardiology follow up appointments to be scheduled within 7 days of hospital discharge.  -CHF education session will be provided to the patient prior to hospital discharge.    Alyssa Espinosa, RN MSN,RN  Heart Failure Navigator

## 2024-01-16 ENCOUNTER — APPOINTMENT (OUTPATIENT)
Dept: GENERAL RADIOLOGY | Age: 89
DRG: 291 | End: 2024-01-16
Payer: MEDICARE

## 2024-01-16 PROBLEM — R06.89 CO2 NARCOSIS: Status: ACTIVE | Noted: 2024-01-16

## 2024-01-16 PROBLEM — J96.02 ACUTE RESPIRATORY FAILURE WITH HYPOXIA AND HYPERCAPNIA (HCC): Status: ACTIVE | Noted: 2024-01-12

## 2024-01-16 LAB
AADO2: 374.7 MMHG
ANION GAP SERPL CALCULATED.3IONS-SCNC: 5 MMOL/L (ref 7–16)
ANION GAP SERPL CALCULATED.3IONS-SCNC: 8 MMOL/L (ref 7–16)
B PARAP IS1001 DNA NPH QL NAA+NON-PROBE: NOT DETECTED
B PERT DNA SPEC QL NAA+PROBE: NOT DETECTED
B.E.: -1.3 MMOL/L (ref -3–3)
B.E.: 1.2 MMOL/L (ref -3–3)
BASOPHILS # BLD: 0 K/UL (ref 0–0.2)
BASOPHILS NFR BLD: 0 % (ref 0–2)
BILIRUB UR QL STRIP: NEGATIVE
BUN SERPL-MCNC: 60 MG/DL (ref 6–23)
BUN SERPL-MCNC: 66 MG/DL (ref 6–23)
C PNEUM DNA NPH QL NAA+NON-PROBE: NOT DETECTED
CALCIUM SERPL-MCNC: 8.6 MG/DL (ref 8.6–10.2)
CALCIUM SERPL-MCNC: 8.8 MG/DL (ref 8.6–10.2)
CHLORIDE SERPL-SCNC: 109 MMOL/L (ref 98–107)
CHLORIDE SERPL-SCNC: 110 MMOL/L (ref 98–107)
CHLORIDE UR-SCNC: <20 MMOL/L
CLARITY UR: ABNORMAL
CO2 SERPL-SCNC: 29 MMOL/L (ref 22–29)
CO2 SERPL-SCNC: 34 MMOL/L (ref 22–29)
COHB: 0.8 % (ref 0–1.5)
COHB: 1.1 % (ref 0–1.5)
COLOR UR: YELLOW
CREAT SERPL-MCNC: 2.5 MG/DL (ref 0.5–1)
CREAT SERPL-MCNC: 2.9 MG/DL (ref 0.5–1)
CREAT UR-MCNC: 110 MG/DL (ref 29–226)
CRITICAL: ABNORMAL
CRITICAL: ABNORMAL
DATE ANALYZED: ABNORMAL
DATE ANALYZED: ABNORMAL
DATE LAST DOSE: NORMAL
DATE OF COLLECTION: ABNORMAL
DATE OF COLLECTION: ABNORMAL
EKG ATRIAL RATE: 110 BPM
EKG Q-T INTERVAL: 312 MS
EKG QRS DURATION: 92 MS
EKG QTC CALCULATION (BAZETT): 448 MS
EKG R AXIS: -36 DEGREES
EKG T AXIS: 130 DEGREES
EKG VENTRICULAR RATE: 124 BPM
EOSINOPHIL # BLD: 0.28 K/UL (ref 0.05–0.5)
EOSINOPHIL,URINE: 0 % (ref 0–1)
EOSINOPHILS RELATIVE PERCENT: 2 % (ref 0–6)
EPI CELLS #/AREA URNS HPF: ABNORMAL /HPF
ERYTHROCYTE [DISTWIDTH] IN BLOOD BY AUTOMATED COUNT: 15.6 % (ref 11.5–15)
FIO2: 100 %
FLUAV RNA NPH QL NAA+NON-PROBE: NOT DETECTED
FLUBV RNA NPH QL NAA+NON-PROBE: NOT DETECTED
GFR SERPL CREATININE-BSD FRML MDRD: 15 ML/MIN/1.73M2
GFR SERPL CREATININE-BSD FRML MDRD: 18 ML/MIN/1.73M2
GLUCOSE SERPL-MCNC: 107 MG/DL (ref 74–99)
GLUCOSE SERPL-MCNC: 134 MG/DL (ref 74–99)
GLUCOSE UR STRIP-MCNC: NEGATIVE MG/DL
HADV DNA NPH QL NAA+NON-PROBE: NOT DETECTED
HCO3: 32.4 MMOL/L (ref 22–26)
HCO3: 32.8 MMOL/L (ref 22–26)
HCOV 229E RNA NPH QL NAA+NON-PROBE: NOT DETECTED
HCOV HKU1 RNA NPH QL NAA+NON-PROBE: NOT DETECTED
HCOV NL63 RNA NPH QL NAA+NON-PROBE: NOT DETECTED
HCOV OC43 RNA NPH QL NAA+NON-PROBE: NOT DETECTED
HCT VFR BLD AUTO: 47.8 % (ref 34–48)
HGB BLD-MCNC: 13.5 G/DL (ref 11.5–15.5)
HGB UR QL STRIP.AUTO: ABNORMAL
HHB: 0.3 % (ref 0–5)
HHB: 2.3 % (ref 0–5)
HMPV RNA NPH QL NAA+NON-PROBE: NOT DETECTED
HPIV1 RNA NPH QL NAA+NON-PROBE: NOT DETECTED
HPIV2 RNA NPH QL NAA+NON-PROBE: NOT DETECTED
HPIV3 RNA NPH QL NAA+NON-PROBE: NOT DETECTED
HPIV4 RNA NPH QL NAA+NON-PROBE: NOT DETECTED
KETONES UR STRIP-MCNC: 15 MG/DL
LAB: ABNORMAL
LAB: ABNORMAL
LACTATE BLDV-SCNC: 1.3 MMOL/L (ref 0.5–2.2)
LEUKOCYTE ESTERASE UR QL STRIP: ABNORMAL
LYMPHOCYTES NFR BLD: 0.28 K/UL (ref 1.5–4)
LYMPHOCYTES RELATIVE PERCENT: 2 % (ref 20–42)
Lab: 1255
Lab: 1735
M PNEUMO DNA NPH QL NAA+NON-PROBE: NOT DETECTED
MAGNESIUM SERPL-MCNC: 2.3 MG/DL (ref 1.6–2.6)
MCH RBC QN AUTO: 29.2 PG (ref 26–35)
MCHC RBC AUTO-ENTMCNC: 28.2 G/DL (ref 32–34.5)
MCV RBC AUTO: 103.5 FL (ref 80–99.9)
METHB: 0.3 % (ref 0–1.5)
METHB: 0.4 % (ref 0–1.5)
MICROORGANISM SPEC CULT: ABNORMAL
MICROORGANISM/AGENT SPEC: ABNORMAL
MODE: ABNORMAL
MODE: ABNORMAL
MONOCYTES NFR BLD: 0.69 K/UL (ref 0.1–0.95)
MONOCYTES NFR BLD: 4 % (ref 2–12)
NEUTROPHILS NFR BLD: 92 % (ref 43–80)
NEUTS SEG NFR BLD: 14.76 K/UL (ref 1.8–7.3)
NITRITE UR QL STRIP: NEGATIVE
O2 CONTENT: 19.1 ML/DL
O2 CONTENT: 19.3 ML/DL
O2 SATURATION: 97.7 % (ref 92–98.5)
O2 SATURATION: 99.7 % (ref 92–98.5)
O2HB: 96.3 % (ref 94–97)
O2HB: 98.5 % (ref 94–97)
OPERATOR ID: 274
OPERATOR ID: 274
PATIENT TEMP: 37 C
PATIENT TEMP: 37 C
PCO2: 115.3 MMHG (ref 35–45)
PCO2: 93.7 MMHG (ref 35–45)
PEEP/CPAP: 8 CMH2O
PFO2: 2.2 MMHG/%
PH BLOOD GAS: 7.07 (ref 7.35–7.45)
PH BLOOD GAS: 7.16 (ref 7.35–7.45)
PH UR STRIP: 5 [PH] (ref 5–9)
PLATELET # BLD AUTO: 154 K/UL (ref 130–450)
PMV BLD AUTO: 12.1 FL (ref 7–12)
PO2: 100.1 MMHG (ref 75–100)
PO2: 219.6 MMHG (ref 75–100)
POTASSIUM SERPL-SCNC: 5.1 MMOL/L (ref 3.5–5)
POTASSIUM SERPL-SCNC: 5.6 MMOL/L (ref 3.5–5)
POTASSIUM, UR: 35.1 MMOL/L
PROT UR STRIP-MCNC: 100 MG/DL
RBC # BLD AUTO: 4.62 M/UL (ref 3.5–5.5)
RBC # BLD: NORMAL 10*6/UL
RBC #/AREA URNS HPF: ABNORMAL /HPF
RI(T): 1.71
RR MECHANICAL: 24 B/MIN
RSV RNA NPH QL NAA+NON-PROBE: NOT DETECTED
RV+EV RNA NPH QL NAA+NON-PROBE: NOT DETECTED
SARS-COV-2 RNA NPH QL NAA+NON-PROBE: NOT DETECTED
SERVICE CMNT-IMP: ABNORMAL
SODIUM SERPL-SCNC: 146 MMOL/L (ref 132–146)
SODIUM SERPL-SCNC: 149 MMOL/L (ref 132–146)
SODIUM UR-SCNC: <20 MMOL/L
SOURCE, BLOOD GAS: ABNORMAL
SOURCE, BLOOD GAS: ABNORMAL
SP GR UR STRIP: 1.02 (ref 1–1.03)
SPECIMEN DESCRIPTION: ABNORMAL
SPECIMEN DESCRIPTION: NORMAL
THB: 13.6 G/DL (ref 11.5–16.5)
THB: 14 G/DL (ref 11.5–16.5)
TIME ANALYZED: 1303
TIME ANALYZED: 1748
TME LAST DOSE: NORMAL H
UROBILINOGEN UR STRIP-ACNC: 0.2 EU/DL (ref 0–1)
UUN UR-MCNC: 636 MG/DL (ref 800–1666)
VANCOMYCIN DOSE: NORMAL MG
VANCOMYCIN SERPL-MCNC: 12.4 UG/ML (ref 5–40)
VT MECHANICAL: 400 ML
WBC #/AREA URNS HPF: ABNORMAL /HPF
WBC OTHER # BLD: 16 K/UL (ref 4.5–11.5)
YEAST URNS QL MICRO: PRESENT

## 2024-01-16 PROCEDURE — 2060000000 HC ICU INTERMEDIATE R&B

## 2024-01-16 PROCEDURE — 84300 ASSAY OF URINE SODIUM: CPT

## 2024-01-16 PROCEDURE — 6360000002 HC RX W HCPCS: Performed by: FAMILY MEDICINE

## 2024-01-16 PROCEDURE — 6370000000 HC RX 637 (ALT 250 FOR IP): Performed by: INTERNAL MEDICINE

## 2024-01-16 PROCEDURE — 2500000003 HC RX 250 WO HCPCS: Performed by: INTERNAL MEDICINE

## 2024-01-16 PROCEDURE — 94640 AIRWAY INHALATION TREATMENT: CPT

## 2024-01-16 PROCEDURE — 6370000000 HC RX 637 (ALT 250 FOR IP): Performed by: FAMILY MEDICINE

## 2024-01-16 PROCEDURE — 6360000002 HC RX W HCPCS: Performed by: SPECIALIST

## 2024-01-16 PROCEDURE — 87205 SMEAR GRAM STAIN: CPT

## 2024-01-16 PROCEDURE — 84540 ASSAY OF URINE/UREA-N: CPT

## 2024-01-16 PROCEDURE — 6360000002 HC RX W HCPCS: Performed by: CLINICAL NURSE SPECIALIST

## 2024-01-16 PROCEDURE — 93010 ELECTROCARDIOGRAM REPORT: CPT | Performed by: INTERNAL MEDICINE

## 2024-01-16 PROCEDURE — 82805 BLOOD GASES W/O2 SATURATION: CPT

## 2024-01-16 PROCEDURE — 81001 URINALYSIS AUTO W/SCOPE: CPT

## 2024-01-16 PROCEDURE — 2580000003 HC RX 258: Performed by: CLINICAL NURSE SPECIALIST

## 2024-01-16 PROCEDURE — 94660 CPAP INITIATION&MGMT: CPT

## 2024-01-16 PROCEDURE — 0202U NFCT DS 22 TRGT SARS-COV-2: CPT

## 2024-01-16 PROCEDURE — 99233 SBSQ HOSP IP/OBS HIGH 50: CPT | Performed by: INTERNAL MEDICINE

## 2024-01-16 PROCEDURE — 80048 BASIC METABOLIC PNL TOTAL CA: CPT

## 2024-01-16 PROCEDURE — 51798 US URINE CAPACITY MEASURE: CPT

## 2024-01-16 PROCEDURE — 87040 BLOOD CULTURE FOR BACTERIA: CPT

## 2024-01-16 PROCEDURE — 83605 ASSAY OF LACTIC ACID: CPT

## 2024-01-16 PROCEDURE — 51702 INSERT TEMP BLADDER CATH: CPT

## 2024-01-16 PROCEDURE — 2580000003 HC RX 258: Performed by: SPECIALIST

## 2024-01-16 PROCEDURE — 2580000003 HC RX 258: Performed by: FAMILY MEDICINE

## 2024-01-16 PROCEDURE — 36415 COLL VENOUS BLD VENIPUNCTURE: CPT

## 2024-01-16 PROCEDURE — 82436 ASSAY OF URINE CHLORIDE: CPT

## 2024-01-16 PROCEDURE — 84133 ASSAY OF URINE POTASSIUM: CPT

## 2024-01-16 PROCEDURE — 83735 ASSAY OF MAGNESIUM: CPT

## 2024-01-16 PROCEDURE — 71045 X-RAY EXAM CHEST 1 VIEW: CPT

## 2024-01-16 PROCEDURE — 5A09457 ASSISTANCE WITH RESPIRATORY VENTILATION, 24-96 CONSECUTIVE HOURS, CONTINUOUS POSITIVE AIRWAY PRESSURE: ICD-10-PCS | Performed by: FAMILY MEDICINE

## 2024-01-16 PROCEDURE — 87106 FUNGI IDENTIFICATION YEAST: CPT

## 2024-01-16 PROCEDURE — 85025 COMPLETE CBC W/AUTO DIFF WBC: CPT

## 2024-01-16 PROCEDURE — 99291 CRITICAL CARE FIRST HOUR: CPT | Performed by: INTERNAL MEDICINE

## 2024-01-16 PROCEDURE — 80202 ASSAY OF VANCOMYCIN: CPT

## 2024-01-16 PROCEDURE — 87086 URINE CULTURE/COLONY COUNT: CPT

## 2024-01-16 PROCEDURE — 2700000000 HC OXYGEN THERAPY PER DAY

## 2024-01-16 PROCEDURE — 82570 ASSAY OF URINE CREATININE: CPT

## 2024-01-16 RX ORDER — IPRATROPIUM BROMIDE AND ALBUTEROL SULFATE 2.5; .5 MG/3ML; MG/3ML
1 SOLUTION RESPIRATORY (INHALATION)
Status: DISCONTINUED | OUTPATIENT
Start: 2024-01-16 | End: 2024-01-18 | Stop reason: HOSPADM

## 2024-01-16 RX ADMIN — METOPROLOL TARTRATE 5 MG: 5 INJECTION INTRAVENOUS at 00:42

## 2024-01-16 RX ADMIN — DIPHENHYDRAMINE HYDROCHLORIDE 50 MG: 50 INJECTION INTRAMUSCULAR; INTRAVENOUS at 01:05

## 2024-01-16 RX ADMIN — DEXTROSE AND SODIUM CHLORIDE: 5; 450 INJECTION, SOLUTION INTRAVENOUS at 00:44

## 2024-01-16 RX ADMIN — DEXTROSE AND SODIUM CHLORIDE: 5; 450 INJECTION, SOLUTION INTRAVENOUS at 23:02

## 2024-01-16 RX ADMIN — ACETAMINOPHEN 650 MG: 650 SUPPOSITORY RECTAL at 13:30

## 2024-01-16 RX ADMIN — VANCOMYCIN HYDROCHLORIDE 1000 MG: 1 INJECTION, POWDER, LYOPHILIZED, FOR SOLUTION INTRAVENOUS at 18:16

## 2024-01-16 RX ADMIN — IPRATROPIUM BROMIDE AND ALBUTEROL SULFATE 1 DOSE: .5; 2.5 SOLUTION RESPIRATORY (INHALATION) at 13:44

## 2024-01-16 RX ADMIN — METOPROLOL TARTRATE 5 MG: 5 INJECTION INTRAVENOUS at 23:32

## 2024-01-16 RX ADMIN — AZITHROMYCIN MONOHYDRATE 500 MG: 500 INJECTION, POWDER, LYOPHILIZED, FOR SOLUTION INTRAVENOUS at 20:11

## 2024-01-16 RX ADMIN — ACETAMINOPHEN 650 MG: 650 SUPPOSITORY RECTAL at 00:58

## 2024-01-16 RX ADMIN — METOPROLOL TARTRATE 5 MG: 5 INJECTION INTRAVENOUS at 11:46

## 2024-01-16 RX ADMIN — METOPROLOL TARTRATE 5 MG: 5 INJECTION INTRAVENOUS at 06:29

## 2024-01-16 RX ADMIN — IPRATROPIUM BROMIDE AND ALBUTEROL SULFATE 1 DOSE: .5; 2.5 SOLUTION RESPIRATORY (INHALATION) at 17:35

## 2024-01-16 RX ADMIN — MEROPENEM 1000 MG: 1 INJECTION, POWDER, FOR SOLUTION INTRAVENOUS at 14:09

## 2024-01-16 RX ADMIN — ENOXAPARIN SODIUM 40 MG: 100 INJECTION SUBCUTANEOUS at 07:57

## 2024-01-16 RX ADMIN — METOPROLOL TARTRATE 5 MG: 5 INJECTION INTRAVENOUS at 18:18

## 2024-01-16 RX ADMIN — DEXTROSE AND SODIUM CHLORIDE: 5; 450 INJECTION, SOLUTION INTRAVENOUS at 10:21

## 2024-01-16 ASSESSMENT — ENCOUNTER SYMPTOMS
NAUSEA: 0
VOMITING: 0

## 2024-01-16 ASSESSMENT — PAIN SCALES - GENERAL
PAINLEVEL_OUTOF10: 2
PAINLEVEL_OUTOF10: 0
PAINLEVEL_OUTOF10: 0

## 2024-01-16 ASSESSMENT — PAIN SCALES - WONG BAKER
WONGBAKER_NUMERICALRESPONSE: 0

## 2024-01-16 NOTE — CARE COORDINATION
1/16/2024 1250 CM spoke with pt's son for transition of care needs. Pt is from [facility], her  resided there with her also but recently passed away from cancer(Dec 2023). Pt's son states that he hopes that she improves mentally as we treat the infection. Her baseline is confused but is fairly independent with her walker and does activities and puzzles at AL, they assist her with bathing. Pt's son is receptive to rehab or return at AL with [facility].   at [facility] is [facility]  ([phone] Ext:[phone]). PT/OT have been ordered. CM will follow. Electronically signed by Kalie Yung RN on 1/16/2024 at 1:05 PM

## 2024-01-16 NOTE — PROGRESS NOTES
NAME: Cris Mejia  MR:  [id number]  :   1935  Admit Date:  2024    Elements of this note, were copied and pasted from Previous. Updates have been made where noted and reflect current exam and medical decision making from the DOS of this encounter.  CHIEF COMPLAINT       Chief Complaint   Patient presents with    Shortness of Breath     81% on room air, 96% on 4L NC; afib for ems with no hx     Altered Mental Status     From [facility] via ems, hx alzheimer, no meds, alert to self baseline, increased confusion and lethargy; no last known well per nursing home; DNR-CCA, bgl 164 for ems      HISTORY OF PRESENT ILLNESS     Cris Mejia is a 88 y.o. female admitted on 2024 and has  has a past medical history of Asthma, CAD (coronary artery disease), Hypercalcemia, Hypertension, Hypokalemia, Obesity, and Osteoarthritis of knee.     This is a face to face encounter   24  - + fevers today 101.9 - obtunded- patient would not arouse for sternal rub- verbal or tactile stimuli.  Notified nursing- supervisor at bedside. Orders placed.     1/15/2024- in bed less confused still red skin dry    afebrile confusion agitation looks red unable to get ROS      Patient is tolerating medications. No reported adverse drug reactions.  Available labs, imaging studies, microbiologic studies have been reviewed       Assessment & Plan     Admitted for   Hypernatremia [E87.0]  Acute cystitis without hematuria [N30.00]  Acute respiratory failure with hypoxia (HCC) [J96.01]  CHF (congestive heart failure), NYHA class I, acute, diastolic (HCC) [I50.31]  Altered mental status, unspecified altered mental status type [R41.82]  Pneumonia due to infectious organism, unspecified laterality, unspecified part of lung [J18.9]  Acute on chronic congestive heart failure, unspecified heart failure type (HCC) [I50.9]  Acute hypoxic respiratory failure (HCC) [J96.01]  ID following for   Fevers  Altered mental status  per  SOP.     Culture Gram Positive Organism Identification and sensitivity to follow.    Culture, Blood 2 [[id number]]  (Abnormal)  (Susceptibility) Collected: 01/12/24 1430    Order Status: Completed Specimen: Blood Updated: 01/15/24 0741     Specimen Description .BLOOD     Special Requests          Direct Exam DIRECT GRAM STAIN FROM BOTTLE: GRAM POSITIVE COCCI IN CLUSTERS     Comment: Results called to and read back by: Emma Yu RN 1/13/24 at 1411         Culture STAPHYLOCOCCUS EPIDERMIDIS Identification by MALDI-TOF    Susceptibility        Staphylococcus epidermidis      BACTERIAL SUSCEPTIBILITY PANEL ANDRESSA      clindamycin <=0.12  Sensitive      DAPTOmycin 0.25  Sensitive      doxycycline 1  Sensitive      erythromycin >=8  Resistant      gentamicin <=0.5  Sensitive  [1]       Induced Clind Resist NEGATIVE  Sensitive      oxacillin <=0.25  Sensitive      rifampin <=0.5  Sensitive      tigecycline <=0.12  Sensitive      trimethoprim-sulfamethoxazole <=10  Sensitive      vancomycin 1  Sensitive                   [1]  Gentamicin is used only in combination with other active agents that test susceptible.                            Recent Labs     01/15/24  0755   WBC 14.9*          Recent Labs     01/14/24  0836 01/15/24  0738   * 150*   K 4.5 5.1*   * 109*   CO2 30* 33*   BUN 41* 51*   CREATININE 1.1* 1.2*   GLUCOSE 74 152*   CALCIUM 8.9 8.8       No results found for: \"SEDRATE\"  No results found for: \"CRP\"  Lab Results   Component Value Date    PROCAL 0.05 01/13/2024     No results for input(s): \"PROCAL\", \"SEDRATE\", \"CRP\", \"FERRITIN\", \"LDH\", \"TROPONINI\", \"DDIMER\", \"FIBRINOGEN\", \"INR\", \"PROTIME\", \"AST\", \"ALT\", \"TRIG\" in the last 72 hours.    No results found for: \"MRSAC\"    Radiology:  US THYROID    Result Date: 1/12/2024  1.  Heterogeneous appearing thyroid gland.  Normal vascularity. 2.  Large right thyroid nodule measuring 41 mm.  Classified as a TR 4 nodule. This meets criteria for FNA.

## 2024-01-16 NOTE — PLAN OF CARE

## 2024-01-16 NOTE — PROGRESS NOTES
Speech Language Pathology  NAME:  Cris Mejia  :  1935  DATE: 2024  ROOM:  0623/0623-02    Pt unavailable at 11:15 am for Modified Barium Swallow Study (MBSS) services due to:    HOLD per RN, Pt too lethargic, attempted to rouse patient not able to     Will re-attempt as appropriate. Thank you.     Kalie Sexton MSCCC/SLP  Speech Language Pathologist  [id number]

## 2024-01-16 NOTE — CONSULTS
SubCUTAneous Daily    [Held by provider] lisinopril  5 mg Oral Daily    [Held by provider] carvedilol  3.125 mg Oral BID WC    [Held by provider] bumetanide  2 mg IntraVENous Daily        dextrose 5 % and 0.45 % NaCl 100 mL/hr at 01/16/24 1021    sodium chloride         Meds prn:     diphenhydrAMINE, perflutren lipid microspheres, perflutren lipid microspheres, sodium chloride flush, sodium chloride, ondansetron **OR** ondansetron, polyethylene glycol, acetaminophen **OR** acetaminophen, potassium chloride **OR** potassium alternative oral replacement **OR** potassium chloride, magnesium sulfate    Meds prior to admission:     No current facility-administered medications on file prior to encounter.     Current Outpatient Medications on File Prior to Encounter   Medication Sig Dispense Refill    Calcium-Magnesium-Vitamin D 600-300-400 LIQD Take by mouth daily Indications: 3 by mouth daily (Patient not taking: Reported on 1/15/2024)      Cinnamon 500 MG CAPS Take 500 mg by mouth daily (Patient not taking: Reported on 1/15/2024)      vitamin E 400 UNIT capsule Take 400 Units by mouth daily (Patient not taking: Reported on 1/15/2024)      Omega-3 Fatty Acids (FISH OIL) 1200 MG CAPS Take 1,200 mg by mouth daily (Patient not taking: Reported on 1/15/2024)      MULTIPLE VITAMIN PO Take by mouth daily (Patient not taking: Reported on 1/15/2024)      vitamin B-12 (CYANOCOBALAMIN) 500 MCG tablet Take 500 mcg by mouth daily (Patient not taking: Reported on 1/15/2024)      Ascorbic Acid (VITAMIN C) 500 MG CHEW Take 500 mg by mouth daily (Patient not taking: Reported on 1/15/2024)      Zinc 50 MG TABS Take 50 mg by mouth daily (Patient not taking: Reported on 1/15/2024)      albuterol sulfate HFA (PROVENTIL HFA) 108 (90 Base) MCG/ACT inhaler Inhale 2 puffs into the lungs every 6 hours as needed for Wheezing (Patient not taking: Reported on 1/15/2024)         Allergies:    Patient has no known allergies.    Social History:      reports that she has never smoked. She does not have any smokeless tobacco history on file.    Family History:     No family history on file.    ROS:     Patient is unable to participate in review of systems due to her current state    Physical Exam:      Patient Vitals for the past 24 hrs:   BP Temp Temp src Pulse Resp SpO2 Weight   01/16/24 1500 103/60 (!) 100.7 °F (38.2 °C) Axillary 100 20 -- --   01/16/24 1334 -- -- -- -- -- 100 % --   01/16/24 1323 -- (!) 101.1 °F (38.4 °C) Axillary (!) 103 -- 100 % --   01/16/24 1320 -- -- -- 100 16 99 % --   01/16/24 1319 106/65 -- -- 95 16 98 % --   01/16/24 1316 -- -- -- (!) 107 -- 98 % --   01/16/24 1215 (!) 99/58 (!) 101.9 °F (38.8 °C) -- 95 -- -- --   01/16/24 1115 111/69 98.3 °F (36.8 °C) Axillary (!) 101 20 94 % --   01/16/24 0545 125/62 99.9 °F (37.7 °C) Axillary (!) 108 22 93 % --   01/16/24 0451 -- -- -- -- -- -- 87 kg (191 lb 12.8 oz)   01/16/24 0410 -- -- -- (!) 103 -- 97 % --   01/16/24 0035 (!) 142/82 (!) 100.8 °F (38.2 °C) Axillary (!) 112 24 97 % --   01/15/24 1700 120/83 (!) 100.7 °F (38.2 °C) Axillary (!) 138 24 -- --         Intake/Output Summary (Last 24 hours) at 1/16/2024 1609  Last data filed at 1/16/2024 1353  Gross per 24 hour   Intake 3712.44 ml   Output 0 ml   Net 3712.44 ml       Constitutional: Patient currently on BiPAP lethargic  Head: normocephalic, atraumatic   Neck: supple, no jvd  Cardiovascular:  S1-S2  Respiratory: Expiratory wheezes all fields  Gastrointestinal: soft, nontender, nondistended, no hepatosplenomegaly  Ext: Upper and lower extremity edema noted  Neuro: Lethargic  Skin: dry, no rash       Data:    Recent Labs     01/15/24  0755 01/16/24  1315   WBC 14.9* 16.0*   HGB 12.9 13.5   HCT 43.8 47.8   MCV 96.7 103.5*    154       Recent Labs     01/14/24  0836 01/15/24  0738 01/16/24  1315   * 150* 149*   K 4.5 5.1* 5.6*   * 109* 110*   CO2 30* 33* 34*   CREATININE 1.1* 1.2* 2.5*   BUN 41* 51* 60*   LABGLOM 49* 43*

## 2024-01-16 NOTE — PROGRESS NOTES
INPATIENT CARDIOLOGY FOLLOW-UP    Name: Cris Mejia    Age: 88 y.o.    Date of Admission: 1/12/2024  1:24 PM    Date of Service: 1/16/2024    Primary Cardiologist: None    Chief Complaint: Follow-up for CHF, pericardial effusion    Interim History:  Somnolent this morning.  No history obtained.    Remains n.p.o. and started on IV metoprolol for elevated heart rate overnight.    Review of Systems:   Unable to obtain    Problem List:  Patient Active Problem List   Diagnosis    Acute respiratory failure with hypoxia (HCC)    CHF (congestive heart failure), NYHA class I, acute, diastolic (HCC)       Current Medications:    Current Facility-Administered Medications:     diphenhydrAMINE (BENADRYL) injection 50 mg, 50 mg, IntraVENous, Q6H PRN, VerCam canalesry F, DO, 50 mg at 01/16/24 0105    metoprolol (LOPRESSOR) injection 5 mg, 5 mg, IntraVENous, Q6H, Jamal Beal MD, 5 mg at 01/16/24 0629    dextrose 5 % and 0.45 % sodium chloride infusion, , IntraVENous, Continuous, Veres, Matt F, DO, Last Rate: 100 mL/hr at 01/16/24 0044, New Bag at 01/16/24 0044    perflutren lipid microspheres (DEFINITY) injection 1.5 mL, 1.5 mL, IntraVENous, ONCE PRN, Veres, Matt F, DO    perflutren lipid microspheres (DEFINITY) injection 1.5 mL, 1.5 mL, IntraVENous, ONCE PRN, Veres, Matt F, DO    azithromycin (ZITHROMAX) 500 mg in sodium chloride 0.9 % 250 mL IVPB ([id number]), 500 mg, IntraVENous, Q24H, Cherie Cuevas MD, Stopped at 01/15/24 2132    sodium chloride flush 0.9 % injection 5-40 mL, 5-40 mL, IntraVENous, 2 times per day, Veres, Matt F, DO, 10 mL at 01/15/24 1021    sodium chloride flush 0.9 % injection 5-40 mL, 5-40 mL, IntraVENous, PRN, Veres, Matt F, DO, 10 mL at 01/15/24 1451    0.9 % sodium chloride infusion, , IntraVENous, PRN, Veres, Matt F, DO    ondansetron (ZOFRAN-ODT) disintegrating tablet 4 mg, 4 mg, Oral, Q8H PRN **OR** ondansetron (ZOFRAN) injection 4 mg, 4 mg, IntraVENous, Q6H PRN, Anne,  displacement of the trachea towards the  left by approximately 1.3 cm.  Further evaluation with sonography and FNA  recommended.      Echocardiogram:   TTE 1/15/24   Left Ventricle: Mildly reduced left ventricular systolic function with a visually estimated EF of 45 - 50%. Left ventricle is mildly dilated. LVIDd is 5.3 cm. Moderate septal thickening. Mild hypokinesis of the following segments: basal inferolateral and mid inferolateral. Indeterminate diastolic function due to atrial fibrillation.    Right Ventricle: Mildly reduced systolic function.    Aortic Valve: Not well visualized. Severely calcified cusp. Classic low flow/low gradient severe aortic stenosis with low EF. AV mean gradient is 20 mmHg. AV area by continuity VTI is 0.6 cm2.    Tricuspid Valve: The estimated RVSP is 41 mmHg.    Left Atrium: Not well visualized. Left atrium is mildly dilated.    Right Atrium: Not well visualized. Right atrium is mildly dilated.    Pericardium: Moderate (1-2 cm) circumferential pericardial effusion present. No indication of cardiac tamponade. Evidence includes normal LV size, no chamber collapse and no significant respiratory transvalvular variation.    Extracardiac: Left pleural effusion.    Image quality is fair. Limited study due to patient's ability to tolerate test.     Stress test:       Cardiac catheterization:     ----------------------------------------------------------------------------------------------------------------------------------------------------------------  IMPRESSION:  Acute heart failure reduced EF proBNP 2600.   Cardiomyopathy EF 45-50%  Atrial fibrillation unknown duration.  Rate controlled.  [id number] at least 4-5  Evidence of low-flow low gradient severe aortic stenosis  Pericardial effusion moderate, no tamponade  YOLY creatinine 0.9-1.2  Hypernatremia 150  Hyperkalemia but hemolyzed specimen  Dysphagia  Encephalopathy with baseline dementia. CT head negative  HTN  Thyroid

## 2024-01-16 NOTE — PROGRESS NOTES
**ADDENDUM: S/p RRT; non-invasive ventilation initiated. Random level @ 1315 = 12.4 mcg/mL. Give vancomycin 1000 mg IV x 1 and repeat random level tomorrow morning    Edouard Lentz PharmD 1/16/2024 5:07 PM       Pharmacy Consultation Note  (Antibiotic Dosing and Monitoring)    Initial consult date: 1/16/24  Consulting physician/provider: ALEXANDREA Tubbs  Drug: Vancomycin  Indication: Pneumonia; Bacteremia    Age/  Gender Height Weight IBW  Allergy Information   88 y.o./female  (no height in emr) 77.3 kg (170 lb 6.7 oz)     Ideal body weight: 57 kg (125 lb 10.6 oz)  Adjusted ideal body weight: 69 kg (152 lb 1.9 oz)   Patient has no known allergies.      Renal Function:  Recent Labs     01/14/24  0836 01/15/24  0738   BUN 41* 51*   CREATININE 1.1* 1.2*       Intake/Output Summary (Last 24 hours) at 1/16/2024 1238  Last data filed at 1/16/2024 0925  Gross per 24 hour   Intake 3712.44 ml   Output 300 ml   Net 3412.44 ml       Vancomycin Monitoring:  Trough:  No results for input(s): \"VANCOTROUGH\" in the last 72 hours.  Random:    Recent Labs     01/14/24  0836   VANCORANDOM 14.4       Vancomycin Administration Times:  Recent vancomycin administrations                     vancomycin (VANCOCIN) 1,000 mg in sodium chloride 0.9 % 250 mL IVPB ([id number]) (mg) 1,000 mg New Bag 01/15/24 1501    vancomycin (VANCOCIN) 1,250 mg in sodium chloride 0.9 % 250 mL IVPB (mg) 1,250 mg New Bag 01/14/24 0213               Assessment:  Patient is a 88 y.o. female who has been initiated on vancomycin  Estimated Creatinine Clearance: 37 mL/min (A) (based on SCr of 1.2 mg/dL (H)).  To dose vancomycin, pharmacy will be utilizing dosing based off of levels because of patient's renal impairment/insufficiency    Plan:  Vancomycin dosed by levels due to possible mild YOLY  Random level @ 1400  Will continue to monitor renal function   Pharmacy to follow      Edouard Lentz PharmD 1/16/2024 12:39 PM   [phone]

## 2024-01-16 NOTE — PROGRESS NOTES
Speech Language Pathology  NAME:  Cris Mejia  :  1935  DATE: 1/15/24  ROOM:  0623/0623-02    Pt unavailable at 2:30 pm for Modified Barium Swallow Study (MBSS) services due to:    HOLD per RN, Pt with decreased O2 sats     Will re-attempt as appropriate. Thank you.     Kalie Sexton MSCCC/SLP  Speech Language Pathologist  [id number]

## 2024-01-16 NOTE — PROGRESS NOTES
Date:2024  Patient Name: Cris Mejia  MRN: [id number]  : 1935  ROOM #: 0623/0623-02    Occupational Therapy order received, chart reviewed and evaluation attempted this date. Patient is unavailable for OT evaluation due to rapid response team called.     Will attempt OT evaluation at a later time. Thank you.   Jose Cruz Guerra OTR/L #[id number]

## 2024-01-16 NOTE — PROGRESS NOTES
Progress Note  Date:2024       Room:23/0623-02  Patient Name:Cris Mejia     YOB: 1935     Age:88 y.o.        Subjective    Subjective:  Symptoms:  Stable.  She reports malaise and weakness.  (Fever last night, some tachycardia, still NPO,  needs swallowing study.  Continues D51/2NS.  ).    Diet:  NPO.  No nausea or vomiting.    Activity level: Impaired due to weakness.       Review of Systems   Gastrointestinal:  Negative for nausea and vomiting.   Neurological:  Positive for weakness.   All other systems reviewed and are negative.    Objective         Vitals Last 24 Hours:  TEMPERATURE:  Temp  Av.4 °F (38 °C)  Min: 99.9 °F (37.7 °C)  Max: 100.8 °F (38.2 °C)  RESPIRATIONS RANGE: Resp  Av.5  Min: 22  Max: 24  PULSE OXIMETRY RANGE: SpO2  Av.5 %  Min: 93 %  Max: 97 %  PULSE RANGE: Pulse  Av  Min: 69  Max: 138  BLOOD PRESSURE RANGE: Systolic (24hrs), Av , Min:120 , Max:142   ; Diastolic (24hrs), Av, Min:62, Max:83    I/O (24Hr):    Intake/Output Summary (Last 24 hours) at 2024 0844  Last data filed at 2024 0630  Gross per 24 hour   Intake 3712.44 ml   Output 300 ml   Net 3412.44 ml     Objective:  General Appearance:  Ill-appearing.    Vital signs: (most recent): Blood pressure 125/62, pulse (!) 108, temperature 99.9 °F (37.7 °C), temperature source Axillary, resp. rate 22, height 1.651 m (5' 5\"), weight 87 kg (191 lb 12.8 oz), SpO2 93 %.  Vital signs are normal.    Output: Producing urine and producing stool.    Lungs:  There are decreased breath sounds.    Heart: Tachycardia.    Abdomen: Abdomen is soft.  Bowel sounds are normal.   There is no abdominal tenderness.     Extremities: Decreased range of motion.      Labs/Imaging/Diagnostics    Labs:  CBC:  Recent Labs     01/15/24  0755   WBC 14.9*   RBC 4.53   HGB 12.9   HCT 43.8   MCV 96.7   RDW 15.1*        CHEMISTRIES:  Recent Labs     24  0836 01/15/24  0738   * 150*   K 4.5 5.1*

## 2024-01-16 NOTE — PROGRESS NOTES
Physical Therapy      Physical Therapy    Room #:   0623/0623-02    Date: 2024       Patient Name: Cris Mejia  : 1935      MRN: [id number]     Patient unavailable for physical therapy treatment due to patient sleeping, request to check back. Patient doesn't wake to name being called. Resting -120s. Will attempt PT treatment at a later time. Thank you.      Nirmala Bentley, PTA    #[id number]

## 2024-01-17 PROBLEM — Z51.5 PALLIATIVE CARE ENCOUNTER: Status: ACTIVE | Noted: 2024-01-17

## 2024-01-17 LAB
AADO2: 475.3 MMHG
ALBUMIN SERPL-MCNC: 3.2 G/DL (ref 3.5–5.2)
ALP SERPL-CCNC: 64 U/L (ref 35–104)
ALT SERPL-CCNC: 21 U/L (ref 0–32)
ANION GAP SERPL CALCULATED.3IONS-SCNC: 10 MMOL/L (ref 7–16)
AST SERPL-CCNC: 35 U/L (ref 0–31)
B.E.: 1 MMOL/L (ref -3–3)
BASOPHILS # BLD: 0.03 K/UL (ref 0–0.2)
BASOPHILS NFR BLD: 0 % (ref 0–2)
BILIRUB SERPL-MCNC: 0.3 MG/DL (ref 0–1.2)
BNP SERPL-MCNC: ABNORMAL PG/ML (ref 0–450)
BUN SERPL-MCNC: 73 MG/DL (ref 6–23)
CALCIUM SERPL-MCNC: 8.4 MG/DL (ref 8.6–10.2)
CHLORIDE SERPL-SCNC: 108 MMOL/L (ref 98–107)
CO2 SERPL-SCNC: 28 MMOL/L (ref 22–29)
COHB: 0.6 % (ref 0–1.5)
COMMENT: ABNORMAL
CREAT SERPL-MCNC: 3.3 MG/DL (ref 0.5–1)
CRITICAL: ABNORMAL
DATE ANALYZED: ABNORMAL
DATE LAST DOSE: NORMAL
DATE OF COLLECTION: ABNORMAL
EOSINOPHIL # BLD: 0.68 K/UL (ref 0.05–0.5)
EOSINOPHILS RELATIVE PERCENT: 5 % (ref 0–6)
ERYTHROCYTE [DISTWIDTH] IN BLOOD BY AUTOMATED COUNT: 15.5 % (ref 11.5–15)
FIO2: 100 %
GFR SERPL CREATININE-BSD FRML MDRD: 13 ML/MIN/1.73M2
GLUCOSE SERPL-MCNC: 96 MG/DL (ref 74–99)
HCO3: 29.7 MMOL/L (ref 22–26)
HCT VFR BLD AUTO: 43.3 % (ref 34–48)
HGB BLD-MCNC: 12.5 G/DL (ref 11.5–15.5)
HHB: 0.7 % (ref 0–5)
IMM GRANULOCYTES # BLD AUTO: 0.05 K/UL (ref 0–0.58)
IMM GRANULOCYTES NFR BLD: 0 % (ref 0–5)
LAB: ABNORMAL
LYMPHOCYTES NFR BLD: 0.32 K/UL (ref 1.5–4)
LYMPHOCYTES RELATIVE PERCENT: 2 % (ref 20–42)
Lab: 730
MAGNESIUM SERPL-MCNC: 2.2 MG/DL (ref 1.6–2.6)
MCH RBC QN AUTO: 28.3 PG (ref 26–35)
MCHC RBC AUTO-ENTMCNC: 28.9 G/DL (ref 32–34.5)
MCV RBC AUTO: 98.2 FL (ref 80–99.9)
METHB: 0.4 % (ref 0–1.5)
MODE: ABNORMAL
MONOCYTES NFR BLD: 0.59 K/UL (ref 0.1–0.95)
MONOCYTES NFR BLD: 4 % (ref 2–12)
NEUTROPHILS NFR BLD: 88 % (ref 43–80)
NEUTS SEG NFR BLD: 11.63 K/UL (ref 1.8–7.3)
O2 CONTENT: 18.4 ML/DL
O2 SATURATION: 99.3 % (ref 92–98.5)
O2HB: 98.3 % (ref 94–97)
OPERATOR ID: 8215
PATIENT TEMP: 37 C
PCO2: 67.4 MMHG (ref 35–45)
PEEP/CPAP: 8 CMH2O
PFO2: 1.45 MMHG/%
PH BLOOD GAS: 7.26 (ref 7.35–7.45)
PLATELET # BLD AUTO: 111 K/UL (ref 130–450)
PMV BLD AUTO: 12.4 FL (ref 7–12)
PO2: 145.3 MMHG (ref 75–100)
POTASSIUM SERPL-SCNC: 4.8 MMOL/L (ref 3.5–5)
PROT SERPL-MCNC: 5.6 G/DL (ref 6.4–8.3)
RBC # BLD AUTO: 4.41 M/UL (ref 3.5–5.5)
RI(T): 3.27
RR MECHANICAL: 20 B/MIN
SODIUM SERPL-SCNC: 146 MMOL/L (ref 132–146)
SOURCE, BLOOD GAS: ABNORMAL
THB: 13.1 G/DL (ref 11.5–16.5)
TIME ANALYZED: 733
TME LAST DOSE: NORMAL H
TROPONIN I SERPL HS-MCNC: 103 NG/L (ref 0–9)
VANCOMYCIN DOSE: NORMAL MG
VANCOMYCIN SERPL-MCNC: 20.4 UG/ML (ref 5–40)
VT MECHANICAL: 500 ML
WBC OTHER # BLD: 13.3 K/UL (ref 4.5–11.5)

## 2024-01-17 PROCEDURE — 99223 1ST HOSP IP/OBS HIGH 75: CPT | Performed by: NURSE PRACTITIONER

## 2024-01-17 PROCEDURE — 80053 COMPREHEN METABOLIC PANEL: CPT

## 2024-01-17 PROCEDURE — 6360000002 HC RX W HCPCS: Performed by: SPECIALIST

## 2024-01-17 PROCEDURE — 36600 WITHDRAWAL OF ARTERIAL BLOOD: CPT

## 2024-01-17 PROCEDURE — 6360000002 HC RX W HCPCS: Performed by: CLINICAL NURSE SPECIALIST

## 2024-01-17 PROCEDURE — 36415 COLL VENOUS BLD VENIPUNCTURE: CPT

## 2024-01-17 PROCEDURE — 99291 CRITICAL CARE FIRST HOUR: CPT | Performed by: INTERNAL MEDICINE

## 2024-01-17 PROCEDURE — 85025 COMPLETE CBC W/AUTO DIFF WBC: CPT

## 2024-01-17 PROCEDURE — 99233 SBSQ HOSP IP/OBS HIGH 50: CPT | Performed by: INTERNAL MEDICINE

## 2024-01-17 PROCEDURE — 82805 BLOOD GASES W/O2 SATURATION: CPT

## 2024-01-17 PROCEDURE — 2500000003 HC RX 250 WO HCPCS: Performed by: INTERNAL MEDICINE

## 2024-01-17 PROCEDURE — 6360000002 HC RX W HCPCS: Performed by: FAMILY MEDICINE

## 2024-01-17 PROCEDURE — 80202 ASSAY OF VANCOMYCIN: CPT

## 2024-01-17 PROCEDURE — 2580000003 HC RX 258: Performed by: CLINICAL NURSE SPECIALIST

## 2024-01-17 PROCEDURE — 2580000003 HC RX 258: Performed by: SPECIALIST

## 2024-01-17 PROCEDURE — 6370000000 HC RX 637 (ALT 250 FOR IP): Performed by: INTERNAL MEDICINE

## 2024-01-17 PROCEDURE — 84145 PROCALCITONIN (PCT): CPT

## 2024-01-17 PROCEDURE — 2580000003 HC RX 258: Performed by: FAMILY MEDICINE

## 2024-01-17 PROCEDURE — 94640 AIRWAY INHALATION TREATMENT: CPT

## 2024-01-17 PROCEDURE — 94761 N-INVAS EAR/PLS OXIMETRY MLT: CPT

## 2024-01-17 PROCEDURE — 94660 CPAP INITIATION&MGMT: CPT

## 2024-01-17 PROCEDURE — 83735 ASSAY OF MAGNESIUM: CPT

## 2024-01-17 PROCEDURE — 2060000000 HC ICU INTERMEDIATE R&B

## 2024-01-17 PROCEDURE — 84484 ASSAY OF TROPONIN QUANT: CPT

## 2024-01-17 PROCEDURE — 83880 ASSAY OF NATRIURETIC PEPTIDE: CPT

## 2024-01-17 RX ORDER — FENTANYL CITRATE 50 UG/ML
25 INJECTION, SOLUTION INTRAMUSCULAR; INTRAVENOUS
Status: DISCONTINUED | OUTPATIENT
Start: 2024-01-17 | End: 2024-01-18 | Stop reason: HOSPADM

## 2024-01-17 RX ORDER — MORPHINE SULFATE 2 MG/ML
2 INJECTION, SOLUTION INTRAMUSCULAR; INTRAVENOUS EVERY 4 HOURS PRN
Status: DISCONTINUED | OUTPATIENT
Start: 2024-01-17 | End: 2024-01-17 | Stop reason: ALTCHOICE

## 2024-01-17 RX ADMIN — MEROPENEM 1000 MG: 1 INJECTION, POWDER, FOR SOLUTION INTRAVENOUS at 01:06

## 2024-01-17 RX ADMIN — IPRATROPIUM BROMIDE AND ALBUTEROL SULFATE 1 DOSE: .5; 2.5 SOLUTION RESPIRATORY (INHALATION) at 18:08

## 2024-01-17 RX ADMIN — MEROPENEM 500 MG: 500 INJECTION, POWDER, FOR SOLUTION INTRAVENOUS at 13:23

## 2024-01-17 RX ADMIN — FENTANYL CITRATE 25 MCG: 50 INJECTION INTRAMUSCULAR; INTRAVENOUS at 22:46

## 2024-01-17 RX ADMIN — DEXTROSE AND SODIUM CHLORIDE: 5; 450 INJECTION, SOLUTION INTRAVENOUS at 13:17

## 2024-01-17 RX ADMIN — DEXTROSE AND SODIUM CHLORIDE: 5; 450 INJECTION, SOLUTION INTRAVENOUS at 17:27

## 2024-01-17 RX ADMIN — IPRATROPIUM BROMIDE AND ALBUTEROL SULFATE 1 DOSE: .5; 2.5 SOLUTION RESPIRATORY (INHALATION) at 06:32

## 2024-01-17 RX ADMIN — IPRATROPIUM BROMIDE AND ALBUTEROL SULFATE 1 DOSE: .5; 2.5 SOLUTION RESPIRATORY (INHALATION) at 14:11

## 2024-01-17 RX ADMIN — FENTANYL CITRATE 25 MCG: 50 INJECTION INTRAMUSCULAR; INTRAVENOUS at 20:31

## 2024-01-17 RX ADMIN — SODIUM CHLORIDE, PRESERVATIVE FREE 10 ML: 5 INJECTION INTRAVENOUS at 21:10

## 2024-01-17 RX ADMIN — METOPROLOL TARTRATE 5 MG: 5 INJECTION INTRAVENOUS at 13:26

## 2024-01-17 RX ADMIN — ENOXAPARIN SODIUM 40 MG: 100 INJECTION SUBCUTANEOUS at 08:45

## 2024-01-17 RX ADMIN — METOPROLOL TARTRATE 5 MG: 5 INJECTION INTRAVENOUS at 18:25

## 2024-01-17 RX ADMIN — IPRATROPIUM BROMIDE AND ALBUTEROL SULFATE 1 DOSE: .5; 2.5 SOLUTION RESPIRATORY (INHALATION) at 09:30

## 2024-01-17 RX ADMIN — AZITHROMYCIN MONOHYDRATE 500 MG: 500 INJECTION, POWDER, LYOPHILIZED, FOR SOLUTION INTRAVENOUS at 21:10

## 2024-01-17 RX ADMIN — METOPROLOL TARTRATE 5 MG: 5 INJECTION INTRAVENOUS at 06:05

## 2024-01-17 ASSESSMENT — ENCOUNTER SYMPTOMS
SHORTNESS OF BREATH: 1
VOMITING: 0
NAUSEA: 0

## 2024-01-17 ASSESSMENT — PAIN SCALES - GENERAL
PAINLEVEL_OUTOF10: 0
PAINLEVEL_OUTOF10: 8
PAINLEVEL_OUTOF10: 1
PAINLEVEL_OUTOF10: 0

## 2024-01-17 ASSESSMENT — PAIN SCALES - WONG BAKER
WONGBAKER_NUMERICALRESPONSE: 0

## 2024-01-17 NOTE — PROGRESS NOTES
Pharmacy Consultation Note  (Antibiotic Dosing and Monitoring)    Initial consult date: 1/16/24  Consulting physician/provider: ALEXANDREA Tubbs  Drug: Vancomycin  Indication: Pneumonia; Bacteremia    Age/  Gender Height Weight IBW  Allergy Information   88 y.o./female  (no height in emr) 77.3 kg (170 lb 6.7 oz)     Ideal body weight: 57 kg (125 lb 10.6 oz)  Adjusted ideal body weight: 69.4 kg (153 lb)   Patient has no known allergies.      Renal Function:  Recent Labs     01/16/24  1315 01/16/24  1931 01/17/24  1345   BUN 60* 66* 73*   CREATININE 2.5* 2.9* 3.3*         Intake/Output Summary (Last 24 hours) at 1/17/2024 1711  Last data filed at 1/17/2024 1533  Gross per 24 hour   Intake 948.1 ml   Output 175 ml   Net 773.1 ml         Vancomycin Monitoring:  Trough:  No results for input(s): \"VANCOTROUGH\" in the last 72 hours.  Random:    Recent Labs     01/16/24  1315 01/17/24  1345   VANCORANDOM 12.4 20.4         Vancomycin Administration Times:  Recent vancomycin administrations                     vancomycin (VANCOCIN) 1,000 mg in sodium chloride 0.9 % 250 mL IVPB ([id number]) (mg) 1,000 mg New Bag 01/16/24 1816    vancomycin (VANCOCIN) 1,000 mg in sodium chloride 0.9 % 250 mL IVPB ([id number]) (mg) 1,000 mg New Bag 01/15/24 1501                    Assessment:  Patient is a 88 y.o. female who has been initiated on vancomycin  Estimated Creatinine Clearance: 13 mL/min (A) (based on SCr of 3.3 mg/dL (H)).  To dose vancomycin, pharmacy will be utilizing dosing based off of levels because of patient's renal impairment/insufficiency  1/17: Random level @ 1345 = 20.4 mcg/mL    Plan:  No dose today  Repeat random level tomorrow with morning labs  Will continue to monitor renal function   Pharmacy to follow      Brenda Najera, PharmD, BCPS 1/17/2024 5:11 PM   [phone]

## 2024-01-17 NOTE — PROGRESS NOTES
* 110* 109* 108*   CO2 33* 34* 29 28   CREATININE 1.2* 2.5* 2.9* 3.3*   BUN 51* 60* 66* 73*   LABGLOM 43* 18* 15* 13*   GLUCOSE 152* 134* 107* 96   CALCIUM 8.8 8.8 8.6 8.4*   MG 2.2 2.3  --  2.2       No results found for: \"VITD25\"    No results found for: \"PTH\"    Recent Labs     01/17/24  1345   ALT 21   AST 35*   ALKPHOS 64   BILITOT 0.3       Recent Labs     01/17/24  1345   LABALBU 3.2*       No results found for: \"FERRITIN\", \"IRON\", \"TIBC\"    No results found for: \"HSAYITSM42\"    No results found for: \"FOLATE\"      Lab Results   Component Value Date/Time    COLORU Yellow 01/16/2024 03:30 PM    NITRU NEGATIVE 01/16/2024 03:30 PM    GLUCOSEU NEGATIVE 01/16/2024 03:30 PM    KETUA 15 01/16/2024 03:30 PM    UROBILINOGEN 0.2 01/16/2024 03:30 PM    BILIRUBINUR NEGATIVE 01/16/2024 03:30 PM       Lab Results   Component Value Date/Time    TUSHAR <20 01/16/2024 03:30 PM       No components found for: \"URIC\"    No results found for: \"LIPIDPAN\"      Assessment and Plan:      Acute kidney injury-  Likely multifactorial in the setting of contrast dosed 1/12, diuretics and ACE inhibitor.  Additionally poor oral intake has been likely contributing as well.  Urine output only recorded as 300 mL past 24 hours  Baseline serum creatinine 0.8 to 0.9 mg/dL and has trended up to 2.5 mg/dL on 1/16 with a recheck on 1/16 of 2.9 mg/dL  ACE inhibitor on hold as well as diuretics  Additionally patient has been febrile with temps recorded as high as 101.9 on 1/16/24.   Montelongo catheter was placed with urine output as recorded for the past 24 hours of 175 mL  Urine studies with a urine sodium of less than 20 as well as urine chloride of less than 20  Will continue IV fluids pending labs        2.  Hypernatremia-  In the setting of lack of free water  IV fluids started  We will continue to follow and adjust rate as needed  Sodium did improve from peak of 150 mmol/L to 146 mmol/L on last check    3.  Hyperkalemia-  In setting of acute  kidney injury and ACE inhibitor  Potassium did improve from 5.6 mmol/L to 5.1 mmol/L on last check  Will follow with IV fluids    4.  Metabolic alkalosis-  CO2 34->29 mmol/L  pH on blood gases obtained earlier today was 7.067  Patient is currently on BiPAP  Metabolic alkalosis perhaps in the setting of contraction alkalosis with use of diuretics.  Will continue off of diuretics    5.  Essential hypertension-  With periods of hypotension now with systolic blood pressure trending in the 110's  Will follow off ACE inhibitor for now      Please note no labs were available at the time of this note    ALEXANDREA Hamilton CNP      Patient seen and examined.   No family member is present at the bedside.  Chart reviewed.  I had a face to face encounter with the patient.   Agree with exam.    Agree with  formulation, assessment and plan as outlined above by ALEXANDREA Moya CNP and directed by me.   Addition and corrections were done as deemed appropriate.   My exam and plan include:     Continue current treatment as outlined above.  Patient is doing poorly.  Renal function is deteriorating.  Hypernatremia has improved.  We'll continue cautious hydration the patient with low fractional excretion of urea of 25% and low fractional excretion of sodium..  But  if renal function doesn't improve the next logical step would be renal replacement therapy.  Patient with multiple comorbid conditions and advanced age and hence she would be a poor candidate for renal replacement therapy.  Palliative care has already been consulted.  We'll discuss with the family tomorrow as no one was in the patient's room when I went to see the patient.               Umesh Robertson MD  Nephrology        Electronically signed by Umesh Robertson MD on 1/17/2024 at 10:23 PM

## 2024-01-17 NOTE — PROGRESS NOTES
unable to draw patient this morning. This nurse tried x2 with no result. Electronically signed by Lily Raza RN on 1/17/2024 at 10:31 AM

## 2024-01-17 NOTE — CONSULTS
living will in [facility], in soft chart  Surrogate/Legal NOK:  Bienvenido Mejia Jr. (Child/POA) [phone]    Spiritual assessment: no spiritual distress identified  Bereavement and grief: to be determined  Referrals to: none today  SUBJECTIVE:     Current medical issues leading to Palliative Medicine involvement include   Active Hospital Problems    Diagnosis Date Noted    CO2 narcosis [R06.89] 01/16/2024    Acute respiratory failure with hypoxia and hypercapnia (HCC) [J96.01, J96.02] 01/12/2024    CHF (congestive heart failure), NYHA class I, acute, diastolic (HCC) [I50.31] 01/12/2024       Details of Conversation:    Chart reviewed.  Patient seen at the bedside, on continuous BiPAP, lethargic.  Patient unable to partake in meaningful conversation and unable to make decisions for herself.  No family present at the bedside.  Update provided by bedside RN.  Reviewed advanced directives in patient's off chart listing patient's son, Bienvenido, as POA.  Call placed to Bienvenido.  Introduced self and the role of palliative medicine.  Discussed patient's current condition and comorbidities.  Discussed goals of care and CODE STATUS options.  Bienvenido states that in the event of cardiopulmonary arrest, patient would not want to be resuscitated.  He is in agreement with limited code, no chest compressions, no intubation.  Bienvenido expresses that his mother is not doing well but would like further update once labs have been resulted.  Will call Bienvenido once labs have been resulted for further goals of care discussion.  Palliative medicine will continue to follow.    Addendum:   Call placed to Bienvenido. Reviewed labs, consultants notes, discussed patients current condition, and co morbidities. Unfortunately, patient continues to have worsening in kidney function, large thyroid mass concerning for malignancy, remains lethargic on continuous BiPAP. Bienvenido is tearful during out conversation stating he does not want his mother to suffer  and dry, impaired skin integrity  Psych: non-anxious affect  Neuro: Lethargic, unable to follow commands, on continuous BiPAP    Objective data reviewed: labs, images, records, medication use, vitals, and chart    Discussed patient and the plan of care with the other IDT members: Floor Nurse, Patient, and Family    Time/Communication  Greater than 50% of time spent, total 75 minutes in counseling and coordination of care at the bedside regarding goals of care and diagnosis and prognosis.    Thank you for allowing Palliative Medicine to participate in the care of Cris Mejia.

## 2024-01-17 NOTE — PROGRESS NOTES
Assessing patient in room, 100% on BIPAP. Mouthcare done, BIPAP removed for approximately one minute. When reapplied patient was desatting with no recovery. Pulse ox in 70's and decreasing, RRT called at 0859. Dr. Chen and team to room. Settings adjusted and patient repositioned, patient slowly recovered to 90's. Will continue to monitor. Electronically signed by Lily Raza RN on 1/17/2024 at 9:29 AM

## 2024-01-17 NOTE — CONSULTS
Pulmonary Critical Care Medicine      PULMONARY CRITICAL CARE CONSULTATION NOTE.    01/17/24    CONSULTING  PHYSICIAN: Dr. Rodríguez     REASON FOR REFERRAL:  Acute respiratory failure       Assessment/ Recommendations-     Acute on chronic respiratory failure-this is multifactorial in etiology.  Principal contributing factor being pulmonary edema and pulmonary vascular congestion from biventricular failure + bilateral compressive atelectasis from bilateral pleural effusions + deconditioning and debility + senility + chronic cor pulmonale    Chronic aspiration pneumonia/pneumonitis    Large thyroid mass causing compressive effect on the trachea-suspicious for thyroid cancer  Probable UTI      -Patient is a limited code and okay for noninvasive ventilation, this can be achieved in the MedSurg floor hence the patient does not need to be transferred to the ICU  -Continue noninvasive ventilation at the current setting and repeat serial ABG  -Cardiology following  -Palliative care consult noted  -Poor prognosis overall  -Infectious disease following, currently on meropenem  -N.p.o., aspiration precautions    History of Present Illness  Ms. Cris Mejia  is a 88 y.o. female  , she is a nursing home resident, she has baseline dementia, she is very deconditioned at baseline.  She is nonverbal and does not respond.  History is obtained from the chart review.  She was initially admitted to the hospital with increasing lethargy, shortness of breath.  Patient has had couple of episodes of aspirations in the past.  CT chest revealed diffuse pulmonary edema, pulmonary vascular congestion and bilateral pleural effusions.  CT also demonstrates a large thyroid mass extending into the mediastinum with some compressive effect on trachea.  She was also found to be hypernatremic to 148 at admission.    Baseline Exercise tolerance/MMRC score( Bold )     MMRC Dyspnea Scale  Grade Description of Breathlessness   0 I     154 111*     BMP:    Recent Labs     01/15/24  0738 01/16/24  1315 01/16/24  1931   * 149* 146   K 5.1* 5.6* 5.1*   * 110* 109*   CO2 33* 34* 29   BUN 51* 60* 66*   CREATININE 1.2* 2.5* 2.9*   GLUCOSE 152* 134* 107*   CALCIUM 8.8 8.8 8.6   MG 2.2 2.3  --        LACTATE:   Recent Labs     01/16/24  1315   LACTA 1.3       BLOOD GAS:   Recent Labs     01/16/24  1735 01/17/24  0730   PH 7.162* 7.262*   PCO2 93.7* 67.4*   PO2 219.6* 145.3*   HCO3 32.8* 29.7*   O2SAT 99.7* 99.3*     UA:  Recent Labs     01/16/24  1530   COLORU Yellow   NITRU NEGATIVE   LEUKOCYTESUR SMALL*   GLUCOSEU NEGATIVE   KETUA 15*   RBCUA 6 TO 9*   WBCUA 0 TO 5       Radiology:  CT-scan of the chest (personally reviewed)    chest X-ray  (personally reviewed)      I have spent a total critical care time of 40 minutes in the care of this critically ill patient excluding any other separately billable procedures but including  Clinical evaluation, decision making , coordination of care, Consultations and documentation time.      Kamran Torre MD

## 2024-01-17 NOTE — PROGRESS NOTES
CMP results sent to Dr. Robertson via perfect serve and sodium clarified via phone call.  No new orders

## 2024-01-17 NOTE — PROGRESS NOTES
Physical Therapy      Physical Therapy    Room #:   0623/0623-02    Date: 2024       Patient Name: Cris Mejia  : 1935      MRN: [id number]     Patient unavailable for physical therapy treatment due to unavailable/not appropriate per nursing due to RRT being called earlier in the day and patient desat with any little movement . Will attempt PT treatment tomorrow. Thank you.      Nirmala Bentley, PTA  #[id number]

## 2024-01-17 NOTE — PROGRESS NOTES
NAME: Cris Mejia  MR:  [id number]  :   1935  Admit Date:  2024    Elements of this note, were copied and pasted from Previous. Updates have been made where noted and reflect current exam and medical decision making from the DOS of this encounter.  CHIEF COMPLAINT       Chief Complaint   Patient presents with    Shortness of Breath     81% on room air, 96% on 4L NC; afib for ems with no hx     Altered Mental Status     From [facility] via ems, hx alzheimer, no meds, alert to self baseline, increased confusion and lethargy; no last known well per nursing home; DNR-CCA, bgl 164 for ems      HISTORY OF PRESENT ILLNESS     Cris Mejia is a 88 y.o. female admitted on 2024 and has  has a past medical history of Asthma, CAD (coronary artery disease), Hypercalcemia, Hypertension, Hypokalemia, Obesity, and Osteoarthritis of knee.     This is a face to face encounter   24  - patient has been afebrile today - on bipap- unresponsive -   Has villaseñor cath in place- that appears to have stool in it.   WBC- 13.3   creat- 2.9     2024-- + fevers today 101.9 - obtunded- patient would not arouse for sternal rub- verbal or tactile stimuli.  Notified nursing- supervisor at bedside. Orders placed.     1/15/2024- in bed less confused still red skin dry    afebrile confusion agitation looks red unable to get ROS      Patient is tolerating medications. No reported adverse drug reactions.  Available labs, imaging studies, microbiologic studies have been reviewed       Assessment & Plan     Admitted for   Hypernatremia [E87.0]  Acute cystitis without hematuria [N30.00]  Acute respiratory failure with hypoxia (HCC) [J96.01]  CHF (congestive heart failure), NYHA class I, acute, diastolic (HCC) [I50.31]  Altered mental status, unspecified altered mental status type [R41.82]  Pneumonia due to infectious organism, unspecified laterality, unspecified part of lung [J18.9]  Acute on chronic congestive    GLUCOSE 134* 107*   CALCIUM 8.8 8.6       No results found for: \"SEDRATE\"  No results found for: \"CRP\"  Lab Results   Component Value Date    PROCAL 0.05 01/13/2024     No results for input(s): \"PROCAL\", \"SEDRATE\", \"CRP\", \"FERRITIN\", \"LDH\", \"TROPONINI\", \"DDIMER\", \"FIBRINOGEN\", \"INR\", \"PROTIME\", \"AST\", \"ALT\", \"TRIG\" in the last 72 hours.    No results found for: \"MRSAC\"    Radiology:  US THYROID    Result Date: 1/12/2024  1.  Heterogeneous appearing thyroid gland.  Normal vascularity. 2.  Large right thyroid nodule measuring 41 mm.  Classified as a TR 4 nodule. This meets criteria for FNA. ACR TI-RADS recommendations: TR5 (>= 7 points):  FNA if >= 1 cm; follow-up if 0.5-0.9 cm in 1, 2, 3, 4, and 5 years TR4 (4-6 points):  FNA if >= 1.5 cm; follow-up if 1.0-1.4 cm in 1, 2, 3, and 5 years TR3 (3 points):  FNA if >= 2.5 cm; follow-up if 1.5-2.4 cm in 1, 3, and 5 years TR2 (2 points):  No FNA or follow-up TR1 (0 points):  No FNA or follow-up ACR TI-RADS recommends that no more than two nodules with the highest ACR TI-RADS point total should be biopsied and no more than four nodules should be followed. RECOMMENDATIONS: Recommend FNA of the right thyroid nodule.     CT CHEST W CONTRAST    Result Date: 1/12/2024  1. Cardiomegaly with mild interstitial edema and small to moderate bilateral pleural effusions.  Findings are consistent with CHF exacerbation. 2. Moderate pericardial effusion. 3. Large, 6.5 x 5.8 x 8.7 cm right thyroid lobe nodule, which extends into the upper mediastinum an resulting in displacement of the trachea towards the left by approximately 1.3 cm.  Further evaluation with sonography and FNA recommended.     CT HEAD WO CONTRAST    Result Date: 1/12/2024  No acute intracranial abnormality. Mild, age-appropriate cerebral atrophy. Mild right maxillary acute sinusitis.     XR CHEST PORTABLE    Result Date: 1/12/2024  1. Bilateral lower lobe airspace disease, left greater than right, atelectasis versus

## 2024-01-17 NOTE — PROGRESS NOTES
Called to evaluate patient due to snoing noises while on Bipap, Patient appears to be obstructing.pillow put behind shoulders to extend patients neck.  Patient's obstruction is less and respirations less sonorous

## 2024-01-17 NOTE — PROGRESS NOTES
Was rounding on patient and noticed her villaseñor was draining brown fluid, it had been salty urine earlier. Removed covers and patient had a large loose and soft bowel movement. Patient was cleaned up. Villaseñor still had brown fluid in it. JOE Clayton was in room seeing patient and felt that it was stool, unsure if patient has a fistula. She recommended a CT scan of the abd/pelvis but deferred to PCP since patient is unstable on continuous BIPAP Dr. Rodríguez to be updated.

## 2024-01-17 NOTE — PROGRESS NOTES
Speech Language Pathology  NAME:  Cris Mejia  :  1935  DATE: 2024  ROOM:  0623/0623-02    Pt unavailable for Dysphagia therapy services due to:    HOLD per RN, Pt not medically appropriate     Will re-attempt as appropriate. Thank you.     Kalie Sexton MSCCC/SLP  Speech Language Pathologist  [phone]

## 2024-01-17 NOTE — PROGRESS NOTES
Attempted to call patients son Bienvenido to update on ABG results and update on RRT this morning, line busy at this time. Electronically signed by Lily Raza RN on 1/17/2024 at 9:26 AM

## 2024-01-17 NOTE — PROGRESS NOTES
Pharmacy Note - Renal Dosing    Meropenem 1g Q12h for treatment of Urinary tract infection. Per [facility] Renal Dose Adjustment Policy, meropenem will be changed to   500mg Q12h extended infusion        Please call with any questions.    Thank you,    Susan Rayo, H

## 2024-01-17 NOTE — PROGRESS NOTES
Comprehensive Nutrition Assessment    Type and Reason for Visit:  Reassess    Nutrition Recommendations/Plan:   Continue NPO  Monitor nutrition progression and provide recommendations as indicated/medically appropriate      Malnutrition Assessment:  Malnutrition Status:  At risk for malnutrition (Comment) (d/t AMS and respiratory demands) (01/17/24 1329)    Context:  Chronic Illness     Findings of the 6 clinical characteristics of malnutrition:  Energy Intake:  75% or less estimated energy requirements for 1 month or longer  Weight Loss:  No significant weight loss     Body Fat Loss:  No significant body fat loss     Muscle Mass Loss:  No significant muscle mass loss    Fluid Accumulation:  Severe     Strength:       Nutrition Assessment:    Pt admit for ARF w/ hypoxia, AMS. Dx: CHF exacerbation, pulmonary edema, cardimegaly, right tyhroid nodule that may be causing dysphagia. Speech following, swallow eval pending. PMHx: Alzheimer's, HTN, HLD, Breast cancer, Diabetes. Pt consult for DI. Pt inappropriate at this time d/t AMS. Upon chart reveiw pt is at potential risk for MAL d/t dyphagia `1 week ago, AMS and thyroid nodule. FILIBERTO po intake d/t AMS. Malnutrition not identified. Continue curretn diet, continue to monitor nutrition progression and provide recommendations as indicated/medically appropriate, f/up per policy. F/Up: Pt continues to decline. RRT was called this am d/t O2 sats in the 50's, pt was placed on Bipap and O2 sats increased to low 90's. Pt listed as poor prognosis. Pt responds only to pain. Pt NPO. Continue NPO, continue to  monitor nutrition progression and provide recommendations as indicated/medically appropriate, f/up per policy.    Nutrition Related Findings:    a/o x1, unable to be aroused, I/O +2285 since admit, abd wdl, bowel sounds active x4, last bm 01/16/24, BLLE edema +2, BLUE +2 edema, K+ 5.1, Chloride 109, elevated BUN/Creatinine, GFR 15, PH 7.263, PCo2 67.4, HCO3 29.7 Wound Type:  None       Current Nutrition Intake & Therapies:    Average Meal Intake: NPO  Average Supplements Intake: None Ordered  Diet NPO    Anthropometric Measures:  Height: 165.1 cm (5' 5\")  Ideal Body Weight (IBW): 125 lbs (57 kg)    Admission Body Weight: 77.3 kg (170 lb 6.7 oz)  Current Body Weight: 88 kg (194 lb 0.1 oz), 155.2 % IBW. Weight Source: Bed Scale (01/17/24)  Current BMI (kg/m2): 32.3  Usual Body Weight: 77.3 kg (170 lb 6.7 oz)  % Weight Change (Calculated): 13.8  Weight Adjustment For: No Adjustment           BMI Categories: Obese Class 1 (BMI 30.0-34.9)    Estimated Daily Nutrient Needs:  Energy Requirements Based On: Formula  Weight Used for Energy Requirements: Current  Energy (kcal/day): 1990- 2600 kcals/day (CBW MSJ SF 1.5-2 COPD guidelines)  Weight Used for Protein Requirements: Ideal  Protein (g/day): 85 - 114 (IBW 1.5-2g/kg COPD guidelines)  Method Used for Fluid Requirements: 1 ml/kcal  Fluid (ml/day): per care team    Nutrition Diagnosis:   Inadequate protein-energy intake related to catabolic illness, cognitive or neurological impairment, impaired respiratory function as evidenced by NPO or clear liquid status due to medical condition    Nutrition Interventions:   Food and/or Nutrient Delivery: Continue NPO  Nutrition Education/Counseling: Education not appropriate  Coordination of Nutrition Care: Continue to monitor while inpatient     Goals:   Goals: Initiate PO diet, Initiate nutrition support, by next RD assessment     Nutrition Monitoring and Evaluation:   Behavioral-Environmental Outcomes: None Identified  Food/Nutrient Intake Outcomes: Diet Advancement/Tolerance  Physical Signs/Symptoms Outcomes: Biochemical Data, Nutrition Focused Physical Findings, Skin, Weight, Fluid Status or Edema, GI Status    Discharge Planning:    Too soon to determine     Rahel Tinoco RD  Contact: [phone]

## 2024-01-17 NOTE — PROGRESS NOTES
Progress Note  Date:2024       Room:23/0623-02  Patient Name:Cris Mejia     YOB: 1935     Age:88 y.o.        Subjective    Subjective:  Symptoms:  Worsening.  She reports shortness of breath and weakness.  (Post RRT,  PCO2 67 this am,  didn't tolerate off Bipap,  labs pending this Am.  BIPAP settings adjusted.  ).    Diet:  NPO.  No nausea or vomiting.    Activity level: Impaired due to weakness.       Review of Systems   Constitutional:  Negative for fever.   Respiratory:  Positive for shortness of breath.    Gastrointestinal:  Negative for nausea and vomiting.   Neurological:  Positive for weakness.   All other systems reviewed and are negative.    Objective         Vitals Last 24 Hours:  TEMPERATURE:  Temp  Av.8 °F (37.7 °C)  Min: 98.3 °F (36.8 °C)  Max: 101.9 °F (38.8 °C)  RESPIRATIONS RANGE: Resp  Av.9  Min: 16  Max: 24  PULSE OXIMETRY RANGE: SpO2  Av %  Min: 94 %  Max: 100 %  PULSE RANGE: Pulse  Av.9  Min: 92  Max: 111  BLOOD PRESSURE RANGE: Systolic (24hrs), Av , Min:97 , Max:118   ; Diastolic (24hrs), Av, Min:44, Max:69    I/O (24Hr):    Intake/Output Summary (Last 24 hours) at 2024 0901  Last data filed at 2024 0600  Gross per 24 hour   Intake 148.1 ml   Output 175 ml   Net -26.9 ml     Objective:  General Appearance:  Ill-appearing.    Vital signs: (most recent): Blood pressure (!) 118/56, pulse (!) 110, temperature 98.8 °F (37.1 °C), temperature source Axillary, resp. rate 24, height 1.651 m (5' 5\"), weight 88 kg (194 lb 0.1 oz), SpO2 90 %.  Vital signs are normal.  No fever.    Output: Producing urine and producing stool.    Lungs:  There are decreased breath sounds.    Heart: Tachycardia.    Abdomen: Abdomen is soft.  There is no abdominal tenderness.     Extremities: Decreased range of motion.    Neurological: (unresponsive).    Skin:  Dry.      Labs/Imaging/Diagnostics    Labs:  CBC:  Recent Labs     01/15/24  0755 24  1315   WBC

## 2024-01-17 NOTE — PROGRESS NOTES
INPATIENT CARDIOLOGY FOLLOW-UP    Name: Cris Mejia    Age: 88 y.o.    Date of Admission: 1/12/2024  1:24 PM    Date of Service: 1/17/2024    Primary Cardiologist: None    Chief Complaint: Follow-up for CHF, pericardial effusion    Interim History:  Somnolent this morning on BiPAP.  Interim events noted of RRT yesterday for hypercapnia.  Fevers yesterday.    Remains n.p.o.    Review of Systems:   Unable to obtain    Problem List:  Patient Active Problem List   Diagnosis    Acute respiratory failure with hypoxia and hypercapnia (HCC)    CHF (congestive heart failure), NYHA class I, acute, diastolic (HCC)    CO2 narcosis       Current Medications:    Current Facility-Administered Medications:     meropenem (MERREM) 1,000 mg in sodium chloride 0.9 % 100 mL IVPB ([id number]), 1,000 mg, IntraVENous, Q12H, Forrest Wolff APRN - CNS, Stopped at 01/17/24 0554    vancomycin (VANCOCIN) intermittent dosing (placeholder), , Other, RX Placeholder, Forrest Wolff APRN - CNS    ipratropium 0.5 mg-albuterol 2.5 mg (DUONEB) nebulizer solution 1 Dose, 1 Dose, Inhalation, Q4H Gustavo DOMINGUEZ Nicholas, , 1 Dose at 01/17/24 0930    diphenhydrAMINE (BENADRYL) injection 50 mg, 50 mg, IntraVENous, Q6H PRN, Matt Rodríguez DO, 50 mg at 01/16/24 0105    metoprolol (LOPRESSOR) injection 5 mg, 5 mg, IntraVENous, Q6H, Jamal Beal MD, 5 mg at 01/17/24 0605    dextrose 5 % and 0.45 % sodium chloride infusion, , IntraVENous, Continuous, Matt Rodríguez DO, Last Rate: 100 mL/hr at 01/16/24 2302, New Bag at 01/16/24 2302    perflutren lipid microspheres (DEFINITY) injection 1.5 mL, 1.5 mL, IntraVENous, ONCE PRN, Matt Rodríguez DO    perflutren lipid microspheres (DEFINITY) injection 1.5 mL, 1.5 mL, IntraVENous, ONCE PRN, Matt Rodríguez DO    azithromycin (ZITHROMAX) 500 mg in sodium chloride 0.9 % 250 mL IVPB ([id number]), 500 mg, IntraVENous, Q24H, Cherie Cuevas MD, Stopped at 01/16/24 2130    sodium  results found for: \"CHOLHDLRATIO\"  No results for input(s): \"PROBNP\" in the last 72 hours.      Cardiac Tests:    EK2024: Atrial fibrillation 83 bpm with left anterior fascicular block.  Anterior infarct.     Telemetry: AF 90s, NSVT up to 5 beats    CXR 2024  Impression:        1. Cardiomegaly.  2. Moderate bilateral pleural effusions, left greater than right.  3. Prominent density at the right lung apex concerning for a thyroid mass  extending into the intrathoracic space.  4. Interstitial prominence concerning for developing pulmonary edema or fluid  overload        CT chest:        Impression:         1. Cardiomegaly with mild interstitial edema and small to moderate bilateral  pleural effusions.  Findings are consistent with CHF exacerbation.  2. Moderate pericardial effusion.  3. Large, 6.5 x 5.8 x 8.7 cm right thyroid lobe nodule, which extends into  the upper mediastinum an resulting in displacement of the trachea towards the  left by approximately 1.3 cm.  Further evaluation with sonography and FNA  recommended.      Echocardiogram:   TTE 1/15/24   Left Ventricle: Mildly reduced left ventricular systolic function with a visually estimated EF of 45 - 50%. Left ventricle is mildly dilated. LVIDd is 5.3 cm. Moderate septal thickening. Mild hypokinesis of the following segments: basal inferolateral and mid inferolateral. Indeterminate diastolic function due to atrial fibrillation.    Right Ventricle: Mildly reduced systolic function.    Aortic Valve: Not well visualized. Severely calcified cusp. Classic low flow/low gradient severe aortic stenosis with low EF. AV mean gradient is 20 mmHg. AV area by continuity VTI is 0.6 cm2.    Tricuspid Valve: The estimated RVSP is 41 mmHg.    Left Atrium: Not well visualized. Left atrium is mildly dilated.    Right Atrium: Not well visualized. Right atrium is mildly dilated.    Pericardium: Moderate (1-2 cm) circumferential pericardial effusion present. No

## 2024-01-17 NOTE — PROGRESS NOTES
Patients two rings were removed due to swelling and concern for circulation (wedding ring and band) Son Bienvenido was at bedside at the time and they were directly handed to him.

## 2024-01-17 NOTE — CODE DOCUMENTATION
RRT called for hyoxia.  Bedside RN states that patient oxygen desated to the 70s during oral care while bi papa was paused.  Bi pap was immediately placed back on patient and rapid called.

## 2024-01-18 VITALS
HEART RATE: 84 BPM | DIASTOLIC BLOOD PRESSURE: 42 MMHG | OXYGEN SATURATION: 87 % | WEIGHT: 194 LBS | RESPIRATION RATE: 26 BRPM | BODY MASS INDEX: 32.32 KG/M2 | HEIGHT: 65 IN | TEMPERATURE: 99.8 F | SYSTOLIC BLOOD PRESSURE: 92 MMHG

## 2024-01-18 PROBLEM — I35.0 AORTIC VALVE STENOSIS: Status: ACTIVE | Noted: 2024-01-18

## 2024-01-18 PROBLEM — I48.91 ATRIAL FIBRILLATION (HCC): Status: ACTIVE | Noted: 2024-01-18

## 2024-01-18 PROBLEM — I50.41 ACUTE COMBINED SYSTOLIC AND DIASTOLIC CONGESTIVE HEART FAILURE (HCC): Status: ACTIVE | Noted: 2024-01-12

## 2024-01-18 PROBLEM — I31.39 PERICARDIAL EFFUSION: Status: ACTIVE | Noted: 2024-01-18

## 2024-01-18 LAB
ANION GAP SERPL CALCULATED.3IONS-SCNC: 9 MMOL/L (ref 7–16)
BUN SERPL-MCNC: 81 MG/DL (ref 6–23)
CALCIUM SERPL-MCNC: 8.5 MG/DL (ref 8.6–10.2)
CHLORIDE SERPL-SCNC: 111 MMOL/L (ref 98–107)
CO2 SERPL-SCNC: 28 MMOL/L (ref 22–29)
CREAT SERPL-MCNC: 2.7 MG/DL (ref 0.5–1)
DATE LAST DOSE: NORMAL
ERYTHROCYTE [DISTWIDTH] IN BLOOD BY AUTOMATED COUNT: 15.6 % (ref 11.5–15)
GFR SERPL CREATININE-BSD FRML MDRD: 17 ML/MIN/1.73M2
GLUCOSE SERPL-MCNC: 119 MG/DL (ref 74–99)
HCT VFR BLD AUTO: 43.7 % (ref 34–48)
HGB BLD-MCNC: 12.4 G/DL (ref 11.5–15.5)
MAGNESIUM SERPL-MCNC: 2.2 MG/DL (ref 1.6–2.6)
MCH RBC QN AUTO: 28.2 PG (ref 26–35)
MCHC RBC AUTO-ENTMCNC: 28.4 G/DL (ref 32–34.5)
MCV RBC AUTO: 99.5 FL (ref 80–99.9)
MICROORGANISM SPEC CULT: ABNORMAL
MICROORGANISM SPEC CULT: NORMAL
MICROORGANISM SPEC CULT: NORMAL
PHOSPHATE SERPL-MCNC: 4.1 MG/DL (ref 2.5–4.5)
PLATELET # BLD AUTO: 106 K/UL (ref 130–450)
PMV BLD AUTO: 12.7 FL (ref 7–12)
POTASSIUM SERPL-SCNC: 4.8 MMOL/L (ref 3.5–5)
RBC # BLD AUTO: 4.39 M/UL (ref 3.5–5.5)
SERVICE CMNT-IMP: NORMAL
SERVICE CMNT-IMP: NORMAL
SODIUM SERPL-SCNC: 148 MMOL/L (ref 132–146)
SPECIMEN DESCRIPTION: ABNORMAL
SPECIMEN DESCRIPTION: NORMAL
SPECIMEN DESCRIPTION: NORMAL
TME LAST DOSE: NORMAL H
TROPONIN I SERPL HS-MCNC: 106 NG/L (ref 0–9)
VANCOMYCIN DOSE: NORMAL MG
VANCOMYCIN SERPL-MCNC: 17.7 UG/ML (ref 5–40)
WBC OTHER # BLD: 12.2 K/UL (ref 4.5–11.5)

## 2024-01-18 PROCEDURE — 87449 NOS EACH ORGANISM AG IA: CPT

## 2024-01-18 PROCEDURE — 2580000003 HC RX 258: Performed by: CLINICAL NURSE SPECIALIST

## 2024-01-18 PROCEDURE — 6360000002 HC RX W HCPCS: Performed by: NURSE PRACTITIONER

## 2024-01-18 PROCEDURE — 2500000003 HC RX 250 WO HCPCS: Performed by: INTERNAL MEDICINE

## 2024-01-18 PROCEDURE — 84484 ASSAY OF TROPONIN QUANT: CPT

## 2024-01-18 PROCEDURE — 84100 ASSAY OF PHOSPHORUS: CPT

## 2024-01-18 PROCEDURE — 94640 AIRWAY INHALATION TREATMENT: CPT

## 2024-01-18 PROCEDURE — 83735 ASSAY OF MAGNESIUM: CPT

## 2024-01-18 PROCEDURE — 2580000003 HC RX 258: Performed by: FAMILY MEDICINE

## 2024-01-18 PROCEDURE — 94660 CPAP INITIATION&MGMT: CPT

## 2024-01-18 PROCEDURE — 99024 POSTOP FOLLOW-UP VISIT: CPT | Performed by: SURGERY

## 2024-01-18 PROCEDURE — 85027 COMPLETE CBC AUTOMATED: CPT

## 2024-01-18 PROCEDURE — 80048 BASIC METABOLIC PNL TOTAL CA: CPT

## 2024-01-18 PROCEDURE — 99232 SBSQ HOSP IP/OBS MODERATE 35: CPT | Performed by: INTERNAL MEDICINE

## 2024-01-18 PROCEDURE — 99232 SBSQ HOSP IP/OBS MODERATE 35: CPT | Performed by: NURSE PRACTITIONER

## 2024-01-18 PROCEDURE — 6370000000 HC RX 637 (ALT 250 FOR IP): Performed by: INTERNAL MEDICINE

## 2024-01-18 PROCEDURE — 6360000002 HC RX W HCPCS: Performed by: FAMILY MEDICINE

## 2024-01-18 PROCEDURE — 6360000002 HC RX W HCPCS: Performed by: CLINICAL NURSE SPECIALIST

## 2024-01-18 PROCEDURE — 80202 ASSAY OF VANCOMYCIN: CPT

## 2024-01-18 RX ORDER — LORAZEPAM 2 MG/ML
0.5 INJECTION INTRAMUSCULAR
Status: DISCONTINUED | OUTPATIENT
Start: 2024-01-18 | End: 2024-01-18 | Stop reason: HOSPADM

## 2024-01-18 RX ORDER — GLYCOPYRROLATE 0.2 MG/ML
0.2 INJECTION INTRAMUSCULAR; INTRAVENOUS EVERY 4 HOURS PRN
Status: DISCONTINUED | OUTPATIENT
Start: 2024-01-18 | End: 2024-01-18 | Stop reason: HOSPADM

## 2024-01-18 RX ORDER — ENOXAPARIN SODIUM 100 MG/ML
30 INJECTION SUBCUTANEOUS DAILY
Status: DISCONTINUED | OUTPATIENT
Start: 2024-01-19 | End: 2024-01-18 | Stop reason: HOSPADM

## 2024-01-18 RX ORDER — LORAZEPAM 2 MG/ML
1 INJECTION INTRAMUSCULAR
Status: DISCONTINUED | OUTPATIENT
Start: 2024-01-18 | End: 2024-01-18 | Stop reason: HOSPADM

## 2024-01-18 RX ORDER — METOPROLOL TARTRATE 1 MG/ML
2.5 INJECTION, SOLUTION INTRAVENOUS EVERY 6 HOURS
Status: DISCONTINUED | OUTPATIENT
Start: 2024-01-18 | End: 2024-01-18 | Stop reason: HOSPADM

## 2024-01-18 RX ORDER — HYDROMORPHONE HYDROCHLORIDE 1 MG/ML
0.25 INJECTION, SOLUTION INTRAMUSCULAR; INTRAVENOUS; SUBCUTANEOUS
Status: DISCONTINUED | OUTPATIENT
Start: 2024-01-18 | End: 2024-01-18 | Stop reason: HOSPADM

## 2024-01-18 RX ORDER — HYDROMORPHONE HYDROCHLORIDE 1 MG/ML
0.5 INJECTION, SOLUTION INTRAMUSCULAR; INTRAVENOUS; SUBCUTANEOUS
Status: DISCONTINUED | OUTPATIENT
Start: 2024-01-18 | End: 2024-01-18 | Stop reason: HOSPADM

## 2024-01-18 RX ADMIN — HYDROMORPHONE HYDROCHLORIDE 0.5 MG: 1 INJECTION, SOLUTION INTRAMUSCULAR; INTRAVENOUS; SUBCUTANEOUS at 12:49

## 2024-01-18 RX ADMIN — SODIUM CHLORIDE, PRESERVATIVE FREE 10 ML: 5 INJECTION INTRAVENOUS at 08:29

## 2024-01-18 RX ADMIN — FENTANYL CITRATE 25 MCG: 50 INJECTION INTRAMUSCULAR; INTRAVENOUS at 08:27

## 2024-01-18 RX ADMIN — HYDROMORPHONE HYDROCHLORIDE 0.5 MG: 1 INJECTION, SOLUTION INTRAMUSCULAR; INTRAVENOUS; SUBCUTANEOUS at 12:18

## 2024-01-18 RX ADMIN — LORAZEPAM 1 MG: 2 INJECTION INTRAMUSCULAR; INTRAVENOUS at 12:03

## 2024-01-18 RX ADMIN — HYDROMORPHONE HYDROCHLORIDE 0.5 MG: 1 INJECTION, SOLUTION INTRAMUSCULAR; INTRAVENOUS; SUBCUTANEOUS at 12:31

## 2024-01-18 RX ADMIN — HYDROMORPHONE HYDROCHLORIDE 0.5 MG: 1 INJECTION, SOLUTION INTRAMUSCULAR; INTRAVENOUS; SUBCUTANEOUS at 12:04

## 2024-01-18 RX ADMIN — MEROPENEM 500 MG: 500 INJECTION, POWDER, FOR SOLUTION INTRAVENOUS at 01:44

## 2024-01-18 RX ADMIN — ENOXAPARIN SODIUM 40 MG: 100 INJECTION SUBCUTANEOUS at 08:26

## 2024-01-18 RX ADMIN — LORAZEPAM 1 MG: 2 INJECTION INTRAMUSCULAR; INTRAVENOUS at 12:33

## 2024-01-18 RX ADMIN — LORAZEPAM 1 MG: 2 INJECTION INTRAMUSCULAR; INTRAVENOUS at 12:50

## 2024-01-18 RX ADMIN — DEXTROSE AND SODIUM CHLORIDE: 5; 450 INJECTION, SOLUTION INTRAVENOUS at 02:45

## 2024-01-18 RX ADMIN — IPRATROPIUM BROMIDE AND ALBUTEROL SULFATE 1 DOSE: .5; 2.5 SOLUTION RESPIRATORY (INHALATION) at 06:44

## 2024-01-18 RX ADMIN — FENTANYL CITRATE 25 MCG: 50 INJECTION INTRAMUSCULAR; INTRAVENOUS at 03:23

## 2024-01-18 RX ADMIN — IPRATROPIUM BROMIDE AND ALBUTEROL SULFATE 1 DOSE: .5; 2.5 SOLUTION RESPIRATORY (INHALATION) at 10:11

## 2024-01-18 RX ADMIN — LORAZEPAM 1 MG: 2 INJECTION INTRAMUSCULAR; INTRAVENOUS at 12:21

## 2024-01-18 ASSESSMENT — PAIN SCALES - WONG BAKER
WONGBAKER_NUMERICALRESPONSE: 0
WONGBAKER_NUMERICALRESPONSE: 0

## 2024-01-18 ASSESSMENT — PAIN SCALES - GENERAL
PAINLEVEL_OUTOF10: 8
PAINLEVEL_OUTOF10: 0
PAINLEVEL_OUTOF10: 0

## 2024-01-18 NOTE — PROGRESS NOTES
Physical Therapy  Physical Therapy    Room #:   0623/0623-02    Date: 2024       Patient Name: Cris Mejia  : 1935      MRN: [id number]     Patient unavailable for physical therapy treatment due to  RN (Regina) stated to hold therapy for today . Will attempt PT treatment when medically stable. Thank you.      David Gruber  [facility]  LIC # [id number]

## 2024-01-18 NOTE — CARE COORDINATION
1/18/2024 1145 CM Note:  Pt health has declined significantly over the last few days.  Palliative medicine have been consulted and spoke in depth with pt's son Bienvenido.  Pt is now a DNRCC and comfort meds and measures have been ordered, nursing will compassionately wean from the Bipap. Di VARGAS at [facility] was updated on pt's status.  CM will follow. Electronically signed by Kalie Yung RN on 1/18/2024 at 11:56 AM

## 2024-01-18 NOTE — PROGRESS NOTES
Pharmacy Consultation Note  (Antibiotic Dosing and Monitoring)    Initial consult date: 1/16/24  Consulting physician/provider: ALEXANDREA Tubbs  Drug: Vancomycin  Indication: Pneumonia; Bacteremia    Age/  Gender Height Weight IBW  Allergy Information   88 y.o./female  (no height in emr) 77.3 kg (170 lb 6.7 oz)     Ideal body weight: 57 kg (125 lb 10.6 oz)  Adjusted ideal body weight: 69.4 kg (153 lb)   Patient has no known allergies.      Renal Function:  Recent Labs     01/16/24  1931 01/17/24  1345 01/18/24  0715   BUN 66* 73* 81*   CREATININE 2.9* 3.3* 2.7*         Intake/Output Summary (Last 24 hours) at 1/18/2024 1448  Last data filed at 1/18/2024 0946  Gross per 24 hour   Intake 810 ml   Output 340 ml   Net 470 ml         Vancomycin Monitoring:  Trough:  No results for input(s): \"VANCOTROUGH\" in the last 72 hours.  Random:    Recent Labs     01/16/24  1315 01/17/24  1345 01/18/24  0602   VANCORANDOM 12.4 20.4 17.7         Vancomycin Administration Times:  Recent vancomycin administrations                     vancomycin (VANCOCIN) 1,000 mg in sodium chloride 0.9 % 250 mL IVPB ([id number]) (mg) 1,000 mg New Bag 01/16/24 1816    vancomycin (VANCOCIN) 1,000 mg in sodium chloride 0.9 % 250 mL IVPB ([id number]) (mg) 1,000 mg New Bag 01/15/24 1501                      Assessment:  Patient is a 88 y.o. female who has been initiated on vancomycin  Estimated Creatinine Clearance: 16 mL/min (A) (based on SCr of 2.7 mg/dL (H)).  To dose vancomycin, pharmacy will be utilizing dosing based off of levels because of patient's renal impairment/insufficiency  1/17: Random level @ 1345 = 20.4 mcg/mL  1/18: Random AM level = 17.7 mcg/mL    Plan:  No dose today  Repeat random level tomorrow with morning labs  Will continue to monitor renal function   Pharmacy to follow      Brenda Najera PharmD, BCPS 1/18/2024 2:48 PM   [phone]

## 2024-01-18 NOTE — PROGRESS NOTES
times per day, Matt Rodríguez F, DO, 10 mL at 01/18/24 0829    sodium chloride flush 0.9 % injection 5-40 mL, 5-40 mL, IntraVENous, PRN, Cam Rodríguezry F, DO, 10 mL at 01/15/24 1451    0.9 % sodium chloride infusion, , IntraVENous, PRN, Verally, Matt F, DO    ondansetron (ZOFRAN-ODT) disintegrating tablet 4 mg, 4 mg, Oral, Q8H PRN **OR** ondansetron (ZOFRAN) injection 4 mg, 4 mg, IntraVENous, Q6H PRN, Cam Rodríguezry F, DO    polyethylene glycol (GLYCOLAX) packet 17 g, 17 g, Oral, Daily PRN, Matt Rodríguez F, DO    acetaminophen (TYLENOL) tablet 650 mg, 650 mg, Oral, Q6H PRN **OR** acetaminophen (TYLENOL) suppository 650 mg, 650 mg, Rectal, Q6H PRN, Matt Rodríguez F, DO, 650 mg at 01/16/24 1330    enoxaparin (LOVENOX) injection 40 mg, 40 mg, SubCUTAneous, Daily, Cam Rodríguezry F, DO, 40 mg at 01/18/24 0826    potassium chloride (KLOR-CON M) extended release tablet 40 mEq, 40 mEq, Oral, PRN **OR** potassium bicarb-citric acid (EFFER-K) effervescent tablet 40 mEq, 40 mEq, Oral, PRN **OR** potassium chloride 10 mEq/100 mL IVPB (Peripheral Line), 10 mEq, IntraVENous, PRN, Cam Rodríguezry F, DO    magnesium sulfate 2000 mg in 50 mL IVPB premix, 2,000 mg, IntraVENous, PRN, Cam Rodríguezry F, DO    [Held by provider] lisinopril (PRINIVIL;ZESTRIL) tablet 5 mg, 5 mg, Oral, Daily, Matt Rodríguez F, DO    [Held by provider] carvedilol (COREG) tablet 3.125 mg, 3.125 mg, Oral, BID WC, Matt Rodríguez F, DO    [Held by provider] bumetanide (BUMEX) injection 2 mg, 2 mg, IntraVENous, Daily, DevendraallyMatt, , 2 mg at 01/14/24 1007    Physical Exam:  BP (!) 92/42   Pulse (!) 117   Temp 99.8 °F (37.7 °C) (Temporal)   Resp 15   Ht 1.651 m (5' 5\")   Wt 88 kg (194 lb 0.1 oz)   SpO2 95%   BMI 32.28 kg/m²   Wt Readings from Last 3 Encounters:   01/17/24 88 kg (194 lb 0.1 oz)   01/19/15 94.9 kg (209 lb 1.9 oz)     Appearance: Ill-appearing, on BiPAP, somnolent  Skin: Intact, no rash  Head: Normocephalic, atraumatic  Eyes: EOMI, no  using voice recognition software. Every effort was made to ensure accuracy; however, inadvertent computerized transcription errors may be present.

## 2024-01-18 NOTE — PROGRESS NOTES
Pulmonary Critical Care Medicine           PULMONARY  CRITICAL CARE   SERVICE DAILY PROGRESS  NOTE     2024   Hospital LOS:  LOS: 6 days     Impression / Recommendations:    Acute on chronic respiratory failure-this is multifactorial in etiology.  Principal contributing factor being pulmonary edema and pulmonary vascular congestion from biventricular failure + bilateral compressive atelectasis from bilateral pleural effusions + deconditioning and debility + senility + chronic cor pulmonale     Chronic aspiration pneumonia/pneumonitis     Large thyroid mass causing compressive effect on the trachea-suspicious for thyroid cancer  Probable UTI     -Patient is a limited code and okay for noninvasive ventilation, this can be achieved in the MedSur floor hence the patient does not need to be transferred to the ICU  - Family waiting for other family members and planning on Comfort care measures   -Cardiology following  -Palliative care consult noted  -Poor prognosis overall  -Infectious disease following, currently on meropenem  -NPO, aspiration precautions      Interval History/Event Changes:  Continues to be unresponsive on NIV   Comfort care measures are being instituted     Allergies  No Known Allergies    Review of Systems    A pertinent review of systems was performed and was otherwise non-contributory.    Vitals-     BP (!) 92/42   Pulse 84   Temp 99.8 °F (37.7 °C) (Temporal)   Resp 26   Ht 1.651 m (5' 5\")   Wt 88 kg (194 lb 0.1 oz)   SpO2 (!) 87%   BMI 32.28 kg/m²    Tmax: Temp (24hrs), Av.9 °F (37.2 °C), Min:97.8 °F (36.6 °C), Max:99.8 °F (37.7 °C)      Hemodynamics:  Cuff:   Systolic (24hrs), Av , Min:92 , Max:127   /Diastolic (24hrs), Av, Min:42, Max:68    Cuff MAP:MAP (mmHg)  Av  Min: 93  Max: 144  P:   Pulse  Av.4  Min: 84  Max: 117      Airway:     Observed RR: Resp  Av.7  Min: 14  Max: 26   Observed O2 sats: SpO2  Av.3 %  Min: 78 %  Max: 100

## 2024-01-18 NOTE — PROGRESS NOTES
Pharmacist Review and Automatic Dose Adjustment of Prophylactic Enoxaparin         The reviewing pharmacist has made an adjustment to the ordered enoxaparin dose or converted to UFH per the approved [facility] protocol and table as identified below.        Cris Mejia is a 88 y.o. female.     Recent Labs     01/16/24  1931 01/17/24  1345 01/18/24  0715   CREATININE 2.9* 3.3* 2.7*       Estimated Creatinine Clearance: 16 mL/min (A) (based on SCr of 2.7 mg/dL (H)).    Recent Labs     01/17/24  1354 01/18/24  0630   HGB 12.5 12.4   HCT 43.3 43.7   * 106*     No results for input(s): \"INR\" in the last 72 hours.    Height:   Ht Readings from Last 1 Encounters:   01/17/24 1.651 m (5' 5\")     Weight:  Wt Readings from Last 1 Encounters:   01/17/24 88 kg (194 lb 0.1 oz)               Plan: Based upon the patient's weight and renal function    Ordered: Enoxaparin 40mg SUBQ Daily    Changed/converted to    New Order: Enoxaparin 30mg SUBQ Daily  (CrC; 16)      Thank you,  Margarito Moreland [facility]  1/18/2024, 11:53 AM

## 2024-01-18 NOTE — PLAN OF CARE
Problem: Discharge Planning  Goal: Discharge to home or other facility with appropriate resources  Outcome: Progressing  Flowsheets  Taken 1/17/2024 2015 by Cleopatra Braden, RN  Discharge to home or other facility with appropriate resources: Identify barriers to discharge with patient and caregiver  Taken 1/17/2024 1750 by Regina Diaz RN  Discharge to home or other facility with appropriate resources: Identify barriers to discharge with patient and caregiver     Problem: Nutrition Deficit:  Goal: Optimize nutritional status  1/18/2024 0224 by Cleopatra Braden, RN  Outcome: Progressing  1/17/2024 1336 by Rahel Tinoco RD  Outcome: Not Progressing  Flowsheets (Taken 1/17/2024 1310)  Nutrient intake appropriate for improving, restoring, or maintaining nutritional needs:   Monitor oral intake, labs, and treatment plans   Recommend appropriate diets, oral nutritional supplements, and vitamin/mineral supplements   Assess nutritional status and recommend course of action

## 2024-01-18 NOTE — PROGRESS NOTES
Nephrology Progress Note  [facility]    From consult 1/16/24-  HPI:   The patient is an 88-year-old female patient we were asked see in regards to acute kidney injury and metabolic alkalosis.  Multiple family members are at the bedside at the time of my visit.  Patient is unable to participate in any meaningful review of systems.  Patient is currently on BiPAP and minimally responsive.  Per the patient's family she was brought into the emergency department on Thursday and since that time she has had a steady decline.  She has not eaten or drank much by mouth.  UA at the time of admission revealed a specific gravity of greater than 1.030 with positive nitrites.  Patient has been being treated for a urinary tract infection with Unasyn and azithromycin.  Additionally the patient has been on diuretics in the form of bumetanide 2 mg IV daily as well as lisinopril 5 mg daily.  These are both on hold as of now.  She did also receive a dose of vancomycin.  Vancomycin level was 12.4 today.  Additionally she has been febrile with a temp recorded today of 101.9 °F with noted hypotension and systolic blood pressure in the 90s today.      1/18/24-when I examined the patient's morning she was on BiPAP.  No family members were at the bedside.  During the interim patient's family has decided to pursue comfort care and BiPAP to be removed.    PMH:    Past Medical History:   Diagnosis Date    Asthma     CAD (coronary artery disease)     Hypercalcemia     Hypertension     Hypokalemia     Obesity     Osteoarthritis of knee        Patient Active Problem List   Diagnosis    Acute hypoxic respiratory failure (HCC)    CHF (congestive heart failure), NYHA class I, acute, diastolic (HCC)    CO2 narcosis    Palliative care encounter       Meds:     meropenem  500 mg IntraVENous Q12H    vancomycin (VANCOCIN) intermittent dosing (placeholder)   Other RX Placeholder    ipratropium 0.5 mg-albuterol 2.5 mg  1 Dose Inhalation Q4H WA RT     metoprolol  5 mg IntraVENous Q6H    sodium chloride flush  5-40 mL IntraVENous 2 times per day    enoxaparin  40 mg SubCUTAneous Daily    [Held by provider] lisinopril  5 mg Oral Daily    [Held by provider] carvedilol  3.125 mg Oral BID WC    [Held by provider] bumetanide  2 mg IntraVENous Daily        dextrose 5 % and 0.45 % NaCl 100 mL/hr at 01/18/24 0245    sodium chloride         Meds prn:     fentaNYL, diphenhydrAMINE, perflutren lipid microspheres, perflutren lipid microspheres, sodium chloride flush, sodium chloride, ondansetron **OR** ondansetron, polyethylene glycol, acetaminophen **OR** acetaminophen, potassium chloride **OR** potassium alternative oral replacement **OR** potassium chloride, magnesium sulfate    Meds prior to admission:     No current facility-administered medications on file prior to encounter.     Current Outpatient Medications on File Prior to Encounter   Medication Sig Dispense Refill    Calcium-Magnesium-Vitamin D 600-300-400 LIQD Take by mouth daily Indications: 3 by mouth daily (Patient not taking: Reported on 1/15/2024)      Cinnamon 500 MG CAPS Take 500 mg by mouth daily (Patient not taking: Reported on 1/15/2024)      vitamin E 400 UNIT capsule Take 400 Units by mouth daily (Patient not taking: Reported on 1/15/2024)      Omega-3 Fatty Acids (FISH OIL) 1200 MG CAPS Take 1,200 mg by mouth daily (Patient not taking: Reported on 1/15/2024)      MULTIPLE VITAMIN PO Take by mouth daily (Patient not taking: Reported on 1/15/2024)      vitamin B-12 (CYANOCOBALAMIN) 500 MCG tablet Take 500 mcg by mouth daily (Patient not taking: Reported on 1/15/2024)      Ascorbic Acid (VITAMIN C) 500 MG CHEW Take 500 mg by mouth daily (Patient not taking: Reported on 1/15/2024)      Zinc 50 MG TABS Take 50 mg by mouth daily (Patient not taking: Reported on 1/15/2024)      albuterol sulfate HFA (PROVENTIL HFA) 108 (90 Base) MCG/ACT inhaler Inhale 2 puffs into the lungs every 6 hours as needed for

## 2024-01-18 NOTE — PROGRESS NOTES
Dr. Rankin- all consults- and [facility] notified of death. Per family request [facility] in [place] notified of death-

## 2024-01-18 NOTE — CONSULTS
Palliative Care Department  [phone]  Palliative Care Progress Note  Provider Lali Rosas, APRN - CNP     Cris Mejia  [id number]  Hospital Day: 7  Date of Initial Consult: 1/17/2024  Referring Provider: Matt Rodríguez DO  Palliative Medicine was consulted for assistance with: Goals of care, overwhelming symptoms, end-stage disease    HPI:   Cris Mejia is a 88 y.o. with a medical history of asthma, CAD, hypertension, hyperlipidemia, diabetes, Alzheimer's dementia, history of breast cancer who was admitted on 1/12/2024 from nursing facility with a CHIEF COMPLAINT of increased lethargy, shortness of breath.   Patient had an episode of dysphagia a week ago.  Speech pathology had recommended a modified barium swallow.  Patient presented to the ED for workup.  CT showed cardiomegaly with pulmonary edema.  proBNP was elevated at 2000.  pH on ABG was 7.3. Right thyroid nodule noted for recommending fine-needle aspiration as well.  Cardiology, nephrology and ID following.  Patient to follow-up with ENT outpatient.  RRT called on 1/16 due to unresponsiveness.  RRT called again on 1/17 due to hypoxia.  Palliative medicine consulted for further assistance.    ASSESSMENT/PLAN:     Pertinent Hospital Diagnoses     Acute respiratory failure with hypoxia and hypercapnia  CHF  Sepsis/bacteremia  Aspiration pneumonia  Possible UTI  YOLY      Palliative Care Encounter / Counseling Regarding Goals of Care  Please see detailed goals of care discussion as below  At this time, Cris Mejia, Does Not have capacity for medical decision-making.  Capacity is time limited and situation/question specific  During encounter Bienvenido was surrogate medical decision-maker  Outcome of goals of care meeting:   Comfort measures  Compassionate wean from BiPAP-once family ready & at bedside  Code status DNR-CC  Advanced Directives: no POA or living will in epic, in soft chart  Surrogate/Legal NOK:  Bienvenido Mejia Jr.

## 2024-01-18 NOTE — PROGRESS NOTES
Patient released to [facility] in [place]. No personal belongs went with her. Family has all personal belongings.

## 2024-01-18 NOTE — PROGRESS NOTES
Dr williamson contacted due to patients composure of discomfort. New order for Morphine 2mg Q 4 PRN ordered at this time.

## 2024-01-18 NOTE — PROGRESS NOTES
Bipap removed from patient 15 L high flow placed. Family at bedside. Comfort medications administered at this time.

## 2024-01-18 NOTE — PROGRESS NOTES
Date:2024  Patient Name: Cris Mejia  MRN: [id number]  : 1935  ROOM #: [id number]    Occupational Therapy order received, chart reviewed and evaluation attempted this date. Patient medically not appropriate for OT evaluation, per RN. Will re-attempt OT evaluation at a later time. Thank you.     Brenda Banks OTR/L #[id number]

## 2024-01-19 LAB — PROCALCITONIN SERPL-MCNC: 0.22 NG/ML (ref 0–0.08)

## 2024-01-20 LAB
1,3 BETA GLUCAN SER-MCNC: <31 PG/ML
1,3 BETA-D-GLUCAN INTERP: NEGATIVE

## 2024-01-21 LAB
MICROORGANISM SPEC CULT: NORMAL
MICROORGANISM SPEC CULT: NORMAL
SERVICE CMNT-IMP: NORMAL
SERVICE CMNT-IMP: NORMAL
SPECIMEN DESCRIPTION: NORMAL
SPECIMEN DESCRIPTION: NORMAL

## 2024-01-22 NOTE — PROGRESS NOTES
Physician Progress Note      PATIENT:               ALEX CURIEL  [facility] #:                  [id number]  :                       1935  ADMIT DATE:       2024 1:24 PM  DISCH DATE:        2024 4:50 PM  RESPONDING  PROVIDER #:        Matt Rodríguez DO        QUERY TEXT:    Type of Encephalopathy: Please provide further specificity, if known.    Clinical indicators include:  Options provided:  -- Anoxic/hypoxic encephalopathy  -- Metabolic encephalopathy  -- Toxic encephalopathy  -- Hepatic encephalopathy  -- Hypertensive encephalopathy  -- Other - I will add my own diagnosis  -- Disagree - Not applicable / Not valid  -- Disagree - Clinically Unable to determine / Unknown        PROVIDER RESPONSE TEXT:    Provider dismissed this query because it was not applicable to the patient or   not a valid query.  alzheimers dementia, advanced      Electronically signed by:  Matt Rodríguez DO 2024 2:05 PM

## 2024-01-23 NOTE — PROGRESS NOTES
Physician Progress Note      PATIENT:               ALEX CURIEL  CSN #:                  [id number]  :                       1935  ADMIT DATE:       2024 1:24 PM  DISCH DATE:        2024 4:50 PM  RESPONDING  PROVIDER #:        Matt Rodríguez DO          QUERY TEXT:    Pt admitted with aspiration pneumonia, acute resp failure. Pt noted to have   Sepsis documented in FP notes . If possible, please document in progress   notes and discharge summary the present on admission status of sepsis:    The medical record reflects the following:  Risk Factors: Aspiration PNA, UTI, Acute resp failure, Dysphagia  Clinical Indicators:: Wbc 9.6, HR 88-92, RR 22-29, Cxr Bila LL airspace   disease, L>R, atelectasis vs pneumonia, mild CHF.  : Wbc 16.0, HR 95, Temp 101.9 max, UC > 100,000 candida tropicalis  Treatment: IVF 500cc bolus, Rocephin, Vibramycin, Unasyn, Zithromax, Vanc,    add Merrem.    Thank you, Phoebe Mark RN [facility]  [phone]  Options provided:  -- Yes, sepsis was present at the time of the order to admit to the hospital  -- No, sepsis was not present on admission and developed during the inpatient   stay  -- Other - I will add my own diagnosis  -- Disagree - Not applicable / Not valid  -- Disagree - Clinically unable to determine / Unknown  -- Refer to Clinical Documentation Reviewer    PROVIDER RESPONSE TEXT:    Yes, sepsis was present at the time of the order to admit to the hospital.    Query created by: Phoebe Mark on 2024 2:20 PM      Electronically signed by:  Matt Rodríguez DO 2024 9:10 AM

## 2024-11-04 NOTE — SIGNIFICANT EVENT
Rapid response team called due to oxygen desaturation.  Patient was receiving oral care, oxygen saturation dropped precipitously to the 50s.  On arrival to bedside she is minimally responsive (compared to prior exam yesterday), grimaces with sternal rub.  Diffuse erythematous rash has improved.  Oxygen saturations were in the low 50s, EPAP on AVAPS increased to 10 and FiO2 100% maintain.  Oxygen saturation gradually improved to the low 90s.  Will continue current noninvasive ventilation settings.  Pulmonology following.  Case discussed with primary physician Dr. Rodríguez on the floor who will assume further care.        NOTE: Portions of this report were transcribed using voice recognition software. Every effort was made to ensure accuracy; however, inadvertent computerized transcription errors may be present.    
oz)   SpO2 100%   BMI 31.92 kg/m²   General Appearance: Unresponsive to verbal and tactile stimuli  Skin: Erythematous rash  Head: normocephalic and atraumatic   Eyes: Pulls pinpoint and sluggish bilaterally  ENT: External nose and ears normal.  Oral mucosa moist.  Throat clear  Neck: supple and non-tender without mass, no thyromegaly or thyroid nodules, no cervical lymphadenopathy  Pulmonary/Chest: Poor inspiratory effort, volumes improved with AVAPS, mildly diminished but otherwise clear without adventitious sounds bilaterally  Cardiovascular: Regular rate and rhythm, S1, S2 without murmurs, gallops, clicks, or rubs.  No carotid bruits.  No appreciable JVD.  Abdomen: soft, non-tender, non-distended, normal bowel sounds, no palpable masses or organomegaly  Extremities: Chronic appearing bilateral lower extremity edema.  No peripheral cyanosis.  No digital clubbing.  Neurologic: Sponsored to verbal and tactile stimuli, spontaneous movements of the extremities but does not appear purposeful      Recent Labs     01/14/24  0836 01/15/24  0738   * 150*   K 4.5 5.1*   * 109*   CO2 30* 33*   BUN 41* 51*   CREATININE 1.1* 1.2*   GLUCOSE 74 152*   CALCIUM 8.9 8.8       Recent Labs     01/15/24  0755   WBC 14.9*   RBC 4.53   HGB 12.9   HCT 43.8   MCV 96.7   MCH 28.5   MCHC 29.5*   RDW 15.1*      MPV 12.0         I/O last 3 completed shifts:  In: 3712.4 [I.V.:3712.4]  Out: 850 [Urine:850]  No intake/output data recorded.      Assessment and Plan:    Principal Problem:    Acute respiratory failure with hypoxia and hypercapnia (HCC)  Active Problems:    CHF (congestive heart failure), NYHA class I, acute, diastolic (HCC)    CO2 narcosis  Resolved Problems:    * No resolved hospital problems. *      Acute respiratory failure with hypoxia and hypercapnia, CO2 narcosis  -Will continue noninvasive ventilation AVAPS mode, repeat ABG in about 4 hours  -Antimicrobials and diuresis per primary team  -Add scheduled 
no